# Patient Record
Sex: FEMALE | Race: WHITE | NOT HISPANIC OR LATINO | Employment: FULL TIME | URBAN - METROPOLITAN AREA
[De-identification: names, ages, dates, MRNs, and addresses within clinical notes are randomized per-mention and may not be internally consistent; named-entity substitution may affect disease eponyms.]

---

## 2017-02-03 ENCOUNTER — APPOINTMENT (OUTPATIENT)
Dept: MEDICAL GROUP | Age: 47
End: 2017-02-03
Payer: COMMERCIAL

## 2017-04-10 ENCOUNTER — OFFICE VISIT (OUTPATIENT)
Dept: MEDICAL GROUP | Age: 47
End: 2017-04-10
Payer: COMMERCIAL

## 2017-04-10 VITALS
HEIGHT: 62 IN | HEART RATE: 85 BPM | SYSTOLIC BLOOD PRESSURE: 102 MMHG | OXYGEN SATURATION: 98 % | BODY MASS INDEX: 29.22 KG/M2 | TEMPERATURE: 98.1 F | DIASTOLIC BLOOD PRESSURE: 62 MMHG | WEIGHT: 158.8 LBS

## 2017-04-10 DIAGNOSIS — B00.1 HERPES LABIALIS: ICD-10-CM

## 2017-04-10 DIAGNOSIS — Z23 NEED FOR VACCINATION: ICD-10-CM

## 2017-04-10 DIAGNOSIS — Z12.31 SCREENING MAMMOGRAM, ENCOUNTER FOR: ICD-10-CM

## 2017-04-10 DIAGNOSIS — M53.3 SI (SACROILIAC) PAIN: ICD-10-CM

## 2017-04-10 PROCEDURE — 90471 IMMUNIZATION ADMIN: CPT | Performed by: FAMILY MEDICINE

## 2017-04-10 PROCEDURE — 90715 TDAP VACCINE 7 YRS/> IM: CPT | Performed by: FAMILY MEDICINE

## 2017-04-10 PROCEDURE — 99214 OFFICE O/P EST MOD 30 MIN: CPT | Mod: 25 | Performed by: FAMILY MEDICINE

## 2017-04-10 RX ORDER — ACYCLOVIR 400 MG/1
400 TABLET ORAL 3 TIMES DAILY
Qty: 30 TAB | Refills: 2 | Status: SHIPPED | OUTPATIENT
Start: 2017-04-10 | End: 2018-03-05

## 2017-04-10 NOTE — Clinical Note
[facility] ??? [facility]  Nelda Jaimes M.D.  [address]  [place] [address]  Fax: [phone]   Authorization for Release/Disclosure of   Protected Health Information   Name: SAMANTHA ROBLERO : 1970 SSN: [id number]   Address: [address] [place] [address]  [place] [address] Phone:    [phone] (home)    I authorize the entity listed below to release/disclose the PHI below to:   [facility]/Nelda Jaimes M.D. and Nelda Jaimes M.D.   Provider or Entity Name:  [facility]   Address   City, State, Zip   Phone:      Fax:     Reason for request: continuity of care   Information to be released:    [  ] LAST COLONOSCOPY,  including any PATH REPORT and follow-up  [  ] LAST FIT/COLOGUARD RESULT [  ] LAST DEXA  [  ] LAST MAMMOGRAM  [  ] LAST PAP  [  ] LAST LABS [  ] RETINA EXAM REPORT  [  ] IMMUNIZATION RECORDS  [ x ] Release all info      [  ] Check here and initial the line next to each item to release ALL health information INCLUDING  _____ Care and treatment for drug and / or alcohol abuse  _____ HIV testing, infection status, or AIDS  _____ Genetic Testing    DATES OF SERVICE OR TIME PERIOD TO BE DISCLOSED: _____________  I understand and acknowledge that:  * This Authorization may be revoked at any time by you in writing, except if your health information has already been used or disclosed.  * Your health information that will be used or disclosed as a result of you signing this authorization could be re-disclosed by the recipient. If this occurs, your re-disclosed health information may no longer be protected by State or Federal laws.  * You may refuse to sign this Authorization. Your refusal will not affect your ability to obtain treatment.  * This Authorization becomes effective upon signing and will  on (date) __________.      If no date is indicated, this Authorization will  one (1) year from the signature date.    Name: Samantha Roblero    Signature:   Date:     4/10/2017       PLEASE FAX REQUESTED  RECORDS BACK TO: [phone]

## 2017-04-10 NOTE — PROGRESS NOTES
Subjective:     Chief Complaint   Patient presents with   • Back Pain     Lower back. Started at her tailbone. Happened by doing a weighted forward lung. x 3 weeks. Was seen at [facility].    • Weight Gain     Samantha Baker is a 46 y.o. female here today for issues listed below      3 weeks ago had sudden low back pain while doing a forward lunge. Dull ache with sharp pain at side of low back with radiation to buttock. Was seen in [facility]. X-ray normal. MRI ordered by not approved by insurance. No red flags of cauda equina. Since then some help with nsaids and limited activity.  Has not tried ice.  HSV oral - outbreaks a few times per year. Needs med refill. No change to frequency, severity or duration of outbreaks. No genital lesions.   Has gained some wt since last OV. (1 lb) worries will gain more since not able to exercise like usual.     Allergies: Review of patient's allergies indicates no known allergies.  Current medicines (including changes today)  Current Outpatient Prescriptions   Medication Sig Dispense Refill   • acyclovir (ZOVIRAX) 400 MG tablet Take 1 Tab by mouth 3 times a day. For 5-7 days as needed for outbreak 30 Tab 2   • fluticasone (FLONASE) 50 MCG/ACT nasal spray Spray 2 Sprays in nose every day. (Patient not taking: Reported on 4/10/2017) 16 g 3     No current facility-administered medications for this visit.       She  has a past medical history of Hyperlipidemia (6/8/2011); Metabolic syndrome (9/4/2011); History of tobacco use (9/4/2011); and Acne rosacea (9/4/2011).  Health Maintenance: due for mammo and Tdap  ROS  Constitutional: Negative for fever, chills/sweats   Respiratory: Negative for shortness of breath, LYNN  Cardiovascular: Negative for chest pain or pressure  GI/: Negative for diarrhea/constipation / urinary difficulty  All other systems reviewed and are negative except as per HPI.        Objective:     Blood pressure 102/62, pulse 85, temperature 36.7 °C (98.1 °F), height 1.562 m  "(5' 1.5\"), weight 72.031 kg (158 lb 12.8 oz), last menstrual period 03/20/2017, SpO2 98 %, not currently breastfeeding. Body mass index is 29.52 kg/(m^2).  Physical Exam:  Alert, oriented in no acute distress.  Eye contact is good, speech goal directed, affect calm   Oral mucous membranes pink and moist with no lesions.  Neck No adenopathy or masses in the neck or supraclavicular regions.   SLT negative. Strength 5/5 prox and distal LE. Moderate tenderness in paraspinous muscles lumbar spine without current spasm. No spinous process tenderness.  moderate pain with stress of SI. Full hip ROM Fair hamstring flexibility.       Assessment and Plan:     Reassured about weight. Discussed kcal monitoring and doing upper body and abd exercises.     Treatment Changes: NSAIDs advised    Samantha was seen today for back pain and weight gain.    Diagnoses and all orders for this visit:    SI (sacroiliac) pain  Comments:  advised relative rest, ice, NSAID and PT  Orders:  -     REFERRAL TO PHYSICAL THERAPY Reason for Therapy: Eval/Treat/Report    Need for vaccination  Comments:  updated today  Orders:  -     TDAP VACCINE =>6YO IM    Screening mammogram, encounter for  Comments:  order given  Orders:  -     MA-SCREEN MAMMO W/CAD-BILAT; Future    Herpes labialis  Comments:  continue med prn  Orders:  -     acyclovir (ZOVIRAX) 400 MG tablet; Take 1 Tab by mouth 3 times a day. For 5-7 days as needed for outbreak        Followup: Return in about 5 months (around 9/10/2017) for  Pap - Dr Del Valle at [facility]. sooner should new symptoms or problems arise.           "

## 2017-04-10 NOTE — MR AVS SNAPSHOT
"        Samantha Baker   4/10/2017 8:10 AM   Office Visit   MRN: [id number]    Department:  [facility]   Dept Phone:  [phone]    Description:  Female : 1970   Provider:  Nelda Jaimes M.D.           Reason for Visit     Back Pain Lower back. Started at her tailbone. Happened by doing a weighted forward lung. x 3 weeks. Was seen at [facility].     Weight Gain           Allergies as of 4/10/2017     No Known Allergies      You were diagnosed with     SI (sacroiliac) pain   [598921]       Need for vaccination   [[id number]]       Screening mammogram, encounter for   [[id number]]       Herpes labialis   [329032]         Vital Signs     Blood Pressure Pulse Temperature Height Weight Body Mass Index    102/62 mmHg 85 36.7 °C (98.1 °F) 1.562 m (5' 1.5\") 72.031 kg (158 lb 12.8 oz) 29.52 kg/m2    Oxygen Saturation Last Menstrual Period Breastfeeding? Smoking Status          98% 2017 No Former Smoker        Basic Information     Date Of Birth Sex Race Ethnicity Preferred Language    1970 Female White Non- English      Your appointments     Sep 12, 2017  7:00 AM   Established Patient with Sabrina Del Valle M.D.   [facility] (--)    [address] [place] [id number] [phone]           You will be receiving a confirmation call a few days before your appointment from our automated call confirmation system.              Problem List              ICD-10-CM Priority Class Noted - Resolved    Hyperlipidemia E78.5   2011 - Present    History of tobacco use Z87.891   2011 - Present    Herpes labialis B00.1   3/13/2014 - Present    Herpes simplex infection of genitourinary system A60.00   10/17/2016 - Present    Overweight (BMI 25.0-29.9) E66.3   10/17/2016 - Present    Seasonal allergic rhinitis due to pollen J30.1   10/17/2016 - Present      Health Maintenance        Date Due Completion Dates    IMM DTaP/Tdap/Td Vaccine (1 - Tdap) 1991 " ---    MAMMOGRAM 11/14/2017 (Originally 7/19/2012) ---    PAP SMEAR 8/26/2017 8/26/2014, 6/8/2011            Current Immunizations     Tdap Vaccine  Incomplete      Below and/or attached are the medications your provider expects you to take. Review all of your home medications and newly ordered medications with your provider and/or pharmacist. Follow medication instructions as directed by your provider and/or pharmacist. Please keep your medication list with you and share with your provider. Update the information when medications are discontinued, doses are changed, or new medications (including over-the-counter products) are added; and carry medication information at all times in the event of emergency situations     Allergies:  No Known Allergies          Medications  Valid as of: April 10, 2017 -  8:29 AM    Generic Name Brand Name Tablet Size Instructions for use    Acyclovir (Tab) ZOVIRAX 400 MG Take 1 Tab by mouth 3 times a day. For 5-7 days as needed for outbreak        Fluticasone Propionate (Suspension) FLONASE 50 MCG/ACT Spray 2 Sprays in nose every day.        .                 Medicines prescribed today were sent to:     [facility] [address] [place] [address], [place] [address]    [address] [place] [address]    Phone: [phone] Fax: [phone]    Open 24 Hours?: No      Medication refill instructions:       If your prescription bottle indicates you have medication refills left, it is not necessary to call your provider’s office. Please contact your pharmacy and they will refill your medication.    If your prescription bottle indicates you do not have any refills left, you may request refills at any time through one of the following ways: The online [employer] system (except Urgent Care), by calling your provider’s office, or by asking your pharmacy to contact your provider’s office with a refill request. Medication refills are processed only during regular business hours and may not be  available until the next business day. Your provider may request additional information or to have a follow-up visit with you prior to refilling your medication.   *Please Note: Medication refills are assigned a new Rx number when refilled electronically. Your pharmacy may indicate that no refills were authorized even though a new prescription for the same medication is available at the pharmacy. Please request the medicine by name with the pharmacy before contacting your provider for a refill.        Your To Do List     Future Labs/Procedures Complete By Expires    MA-SCREEN MAMMO W/CAD-BILAT  As directed 5/11/2018      Referral     A referral request has been sent to our patient care coordination department. Please allow 3-5 business days for us to process this request and contact you either by phone or mail. If you do not hear from us by the 5th business day, please call us at [phone].        Instructions    Take naproxen twice per day with food for 5 days then just in am as needed.   Stretch!            [employer] Access Code: Activation code not generated  Current [employer] Status: Active

## 2017-08-05 ENCOUNTER — OFFICE VISIT (OUTPATIENT)
Dept: URGENT CARE | Facility: CLINIC | Age: 47
End: 2017-08-05
Payer: COMMERCIAL

## 2017-08-05 VITALS
DIASTOLIC BLOOD PRESSURE: 80 MMHG | TEMPERATURE: 98.9 F | HEIGHT: 61 IN | WEIGHT: 161.2 LBS | BODY MASS INDEX: 30.43 KG/M2 | OXYGEN SATURATION: 95 % | RESPIRATION RATE: 16 BRPM | HEART RATE: 99 BPM | SYSTOLIC BLOOD PRESSURE: 124 MMHG

## 2017-08-05 DIAGNOSIS — R30.0 DYSURIA: ICD-10-CM

## 2017-08-05 DIAGNOSIS — N30.01 ACUTE CYSTITIS WITH HEMATURIA: ICD-10-CM

## 2017-08-05 LAB
APPEARANCE UR: CLEAR
BILIRUB UR STRIP-MCNC: NORMAL MG/DL
COLOR UR AUTO: YELLOW
GLUCOSE UR STRIP.AUTO-MCNC: NORMAL MG/DL
KETONES UR STRIP.AUTO-MCNC: NORMAL MG/DL
LEUKOCYTE ESTERASE UR QL STRIP.AUTO: NORMAL
NITRITE UR QL STRIP.AUTO: NORMAL
PH UR STRIP.AUTO: 5 [PH] (ref 5–8)
PROT UR QL STRIP: NORMAL MG/DL
RBC UR QL AUTO: NORMAL
SP GR UR STRIP.AUTO: 1.02
UROBILINOGEN UR STRIP-MCNC: NORMAL MG/DL

## 2017-08-05 PROCEDURE — 81002 URINALYSIS NONAUTO W/O SCOPE: CPT | Performed by: NURSE PRACTITIONER

## 2017-08-05 PROCEDURE — 99214 OFFICE O/P EST MOD 30 MIN: CPT | Performed by: NURSE PRACTITIONER

## 2017-08-05 RX ORDER — PHENAZOPYRIDINE HYDROCHLORIDE 200 MG/1
200 TABLET, FILM COATED ORAL 3 TIMES DAILY
Qty: 6 TAB | Refills: 0 | Status: SHIPPED | OUTPATIENT
Start: 2017-08-05 | End: 2017-08-07

## 2017-08-05 RX ORDER — NITROFURANTOIN 25; 75 MG/1; MG/1
100 CAPSULE ORAL EVERY 12 HOURS
Qty: 10 CAP | Refills: 0 | Status: SHIPPED | OUTPATIENT
Start: 2017-08-05 | End: 2017-09-26 | Stop reason: SDUPTHER

## 2017-08-05 ASSESSMENT — ENCOUNTER SYMPTOMS
VOMITING: 0
NAUSEA: 0
FLANK PAIN: 0
CHILLS: 0

## 2017-08-05 ASSESSMENT — PAIN SCALES - GENERAL: PAINLEVEL: NO PAIN

## 2017-08-05 NOTE — PROGRESS NOTES
Subjective:      Samantha Baker is a 47 y.o. female who presents with Dysuria            Dysuria   This is a new problem. Episode onset: started about 4 days ago with pain and frequency. She has had UTIs in the past and this is exactly the symptoms she usually has. The problem occurs every urination. The problem has been unchanged. The quality of the pain is described as burning. There has been no fever. She is sexually active. There is no history of pyelonephritis. Associated symptoms include frequency, hematuria and urgency. Pertinent negatives include no chills, discharge, flank pain, nausea or vomiting. Associated symptoms comments: She is currently on her period. She has tried increased fluids and antibiotics (States she is taking some of her kids left over Amox for the last two days. She does not know the dose, but she has taken 4 pills so far) for the symptoms. The treatment provided no relief. There is no history of kidney stones or recurrent UTIs.       Review of Systems   Constitutional: Negative for chills.   Gastrointestinal: Negative for nausea and vomiting.   Genitourinary: Positive for dysuria, urgency, frequency and hematuria. Negative for flank pain.   All other systems reviewed and are negative.    Past Medical History   Diagnosis Date   • Hyperlipidemia 6/8/2011   • Metabolic syndrome 9/4/2011   • History of tobacco use 9/4/2011   • Acne rosacea 9/4/2011      Past Surgical History   Procedure Laterality Date   • Elana by laparoscopy  11/27/08     Performed by LUCAS HILL at SURGERY [facility]   • Cervical disk and fusion anterior  3/8/2016     Procedure: CERVICAL DISK AND FUSION ANTERIOR C6-7;  Surgeon: Jonas Ramos M.D.;  Location: SURGERY [facility];  Service:       Social History     Social History   • Marital Status:      Spouse Name: N/A   • Number of Children: N/A   • Years of Education: N/A     Occupational History   • Not on file.     Social History Main  "Topics   • Smoking status: Former Smoker -- 1.00 packs/day for 10 years     Types: Cigarettes     Quit date: 01/01/2008   • Smokeless tobacco: Never Used   • Alcohol Use: No   • Drug Use: No   • Sexual Activity:     Partners: Male      Comment: vasectomy     Other Topics Concern   • Not on file     Social History Narrative          Objective:     /80 mmHg  Pulse 99  Temp(Src) 37.2 °C (98.9 °F)  Resp 16  Ht 1.554 m (5' 1.2\")  Wt 73.12 kg (161 lb 3.2 oz)  BMI 30.28 kg/m2  SpO2 95%  Breastfeeding? No     Physical Exam   Constitutional: She is oriented to person, place, and time. Vital signs are normal. She appears well-developed and well-nourished.   HENT:   Head: Normocephalic and atraumatic.   Eyes: EOM are normal. Pupils are equal, round, and reactive to light.   Neck: Normal range of motion.   Cardiovascular: Normal rate and regular rhythm.    Pulmonary/Chest: Effort normal.   Abdominal: Soft. Normal appearance. There is tenderness in the suprapubic area. There is no CVA tenderness.   Musculoskeletal: Normal range of motion.   Neurological: She is alert and oriented to person, place, and time.   Skin: Skin is warm and dry.   Psychiatric: She has a normal mood and affect. Her speech is normal and behavior is normal. Thought content normal.   Vitals reviewed.         Lab Results   Component Value Date/Time    POC COLOR yellow 08/05/2017 01:45 PM    POC APPEARANCE clear 08/05/2017 01:45 PM    POC LEUKOCYTE ESTERASE neg 08/05/2017 01:45 PM    POC NITRITES neg 08/05/2017 01:45 PM    POC UROBILIGEN neg 08/05/2017 01:45 PM    POC PROTEIN neg 08/05/2017 01:45 PM    POC URINE PH 5.0 08/05/2017 01:45 PM    POC BLOOD trace hemo 08/05/2017 01:45 PM    POC SPECIFIC GRAVITY 1.025 08/05/2017 01:45 PM    POC KETONES neg 08/05/2017 01:45 PM    POC BILIRUBEN neg 08/05/2017 01:45 PM    POC GLUCOSE neg 08/05/2017 01:45 PM           Assessment/Plan:     1. Acute cystitis with hematuria [N30.01]  - nitrofurantoin monohydr " macro (MACROBID) 100 MG Cap; Take 1 Cap by mouth every 12 hours for 5 days.  Dispense: 10 Cap; Refill: 0  - phenazopyridine (PYRIDIUM) 200 MG Tab; Take 1 Tab by mouth 3 times a day for 2 days.  Dispense: 6 Tab; Refill: 0    2. Dysuria  - POCT Urinalysis    Increase water intake  Discontinue use of other antibiotic  RTC if s/s do not improve  Supportive care, differential diagnoses, and indications for immediate follow-up discussed with patient.    Pathogenesis of diagnosis discussed including typical length and natural progression.      Instructed to return to  or nearest emergency department if symptoms fail to improve, for any change in condition, further concerns, or new concerning symptoms.  Patient states understanding of the plan of care and discharge instructions.

## 2017-08-05 NOTE — MR AVS SNAPSHOT
"        Samantha Baker   2017 1:30 PM   Office Visit   MRN: [id number]    Department:  [facility]   Dept Phone:  [phone]    Description:  Female : 1970   Provider:  CATHERINE Del Toro           Reason for Visit     Dysuria poss UTI      Allergies as of 2017     No Known Allergies      You were diagnosed with     Acute cystitis with hematuria   [469430]       Dysuria   [788.1.ICD-9-CM]         Vital Signs     Blood Pressure Pulse Temperature Respirations Height Weight    124/80 mmHg 99 37.2 °C (98.9 °F) 16 1.554 m (5' 1.2\") 73.12 kg (161 lb 3.2 oz)    Body Mass Index Oxygen Saturation Breastfeeding? Smoking Status          30.28 kg/m2 95% No Former Smoker        Basic Information     Date Of Birth Sex Race Ethnicity Preferred Language    1970 Female White Non- English      Your appointments     Sep 12, 2017  7:00 AM   Established Patient with Sabrina Del Valle M.D.   [facility] (--)    [address] [place] [id number] [phone]           You will be receiving a confirmation call a few days before your appointment from our automated call confirmation system.              Problem List              ICD-10-CM Priority Class Noted - Resolved    Hyperlipidemia E78.5   2011 - Present    History of tobacco use Z87.891   2011 - Present    Herpes labialis B00.1   3/13/2014 - Present    Herpes simplex infection of genitourinary system A60.00   10/17/2016 - Present    Overweight (BMI 25.0-29.9) E66.3   10/17/2016 - Present    Seasonal allergic rhinitis due to pollen J30.1   10/17/2016 - Present      Health Maintenance        Date Due Completion Dates    MAMMOGRAM 2017 (Originally 2012) ---    PAP SMEAR 2017, 2011    IMM INFLUENZA (1) 2017 ---    IMM DTaP/Tdap/Td Vaccine (2 - Td) 4/10/2027 4/10/2017            Results     POCT Urinalysis      Component Value Standard Range & Units   "    POC Color yellow Negative    POC Appearance clear Negative    POC Leukocyte Esterase neg Negative    POC Nitrites neg Negative    POC Urobiligen neg Negative (0.2) mg/dL    POC Protein neg Negative mg/dL    POC Urine PH 5.0 5.0 - 8.0    POC Blood trace hemo Negative    POC Specific Gravity 1.025 <1.005 - >1.030    POC Ketones neg Negative mg/dL    POC Biliruben neg Negative mg/dL    POC Glucose neg Negative mg/dL                        Current Immunizations     Tdap Vaccine 4/10/2017      Below and/or attached are the medications your provider expects you to take. Review all of your home medications and newly ordered medications with your provider and/or pharmacist. Follow medication instructions as directed by your provider and/or pharmacist. Please keep your medication list with you and share with your provider. Update the information when medications are discontinued, doses are changed, or new medications (including over-the-counter products) are added; and carry medication information at all times in the event of emergency situations     Allergies:  No Known Allergies          Medications  Valid as of: August 05, 2017 -  3:42 PM    Generic Name Brand Name Tablet Size Instructions for use    Acyclovir (Tab) ZOVIRAX 400 MG Take 1 Tab by mouth 3 times a day. For 5-7 days as needed for outbreak        Fluticasone Propionate (Suspension) FLONASE 50 MCG/ACT Spray 2 Sprays in nose every day.        Nitrofurantoin Monohyd Macro (Cap) MACROBID 100 MG Take 1 Cap by mouth every 12 hours for 5 days.        Phenazopyridine HCl (Tab) PYRIDIUM 200 MG Take 1 Tab by mouth 3 times a day for 2 days.        .                 Medicines prescribed today were sent to:     [facility] [place] - [address]    [address] [place] [address]    Phone: [phone] Fax: [phone]    Open 24 Hours?: No      Medication refill instructions:       If your prescription bottle indicates you have medication  refills left, it is not necessary to call your provider’s office. Please contact your pharmacy and they will refill your medication.    If your prescription bottle indicates you do not have any refills left, you may request refills at any time through one of the following ways: The online [employer] system (except Urgent Care), by calling your provider’s office, or by asking your pharmacy to contact your provider’s office with a refill request. Medication refills are processed only during regular business hours and may not be available until the next business day. Your provider may request additional information or to have a follow-up visit with you prior to refilling your medication.   *Please Note: Medication refills are assigned a new Rx number when refilled electronically. Your pharmacy may indicate that no refills were authorized even though a new prescription for the same medication is available at the pharmacy. Please request the medicine by name with the pharmacy before contacting your provider for a refill.           [employer] Access Code: Activation code not generated  Current [employer] Status: Active

## 2017-09-26 ENCOUNTER — HOSPITAL ENCOUNTER (OUTPATIENT)
Facility: MEDICAL CENTER | Age: 47
End: 2017-09-26
Attending: NURSE PRACTITIONER
Payer: COMMERCIAL

## 2017-09-26 ENCOUNTER — OFFICE VISIT (OUTPATIENT)
Dept: MEDICAL GROUP | Facility: CLINIC | Age: 47
End: 2017-09-26
Payer: COMMERCIAL

## 2017-09-26 VITALS
BODY MASS INDEX: 29.44 KG/M2 | WEIGHT: 160 LBS | HEART RATE: 82 BPM | RESPIRATION RATE: 14 BRPM | OXYGEN SATURATION: 95 % | SYSTOLIC BLOOD PRESSURE: 120 MMHG | HEIGHT: 62 IN | TEMPERATURE: 98.9 F | DIASTOLIC BLOOD PRESSURE: 78 MMHG

## 2017-09-26 DIAGNOSIS — R30.0 DYSURIA: ICD-10-CM

## 2017-09-26 DIAGNOSIS — Z28.21 INFLUENZA VACCINATION DECLINED: ICD-10-CM

## 2017-09-26 LAB
APPEARANCE UR: NORMAL
BILIRUB UR STRIP-MCNC: NORMAL MG/DL
COLOR UR AUTO: NORMAL
GLUCOSE UR STRIP.AUTO-MCNC: NORMAL MG/DL
KETONES UR STRIP.AUTO-MCNC: NORMAL MG/DL
LEUKOCYTE ESTERASE UR QL STRIP.AUTO: NORMAL
NITRITE UR QL STRIP.AUTO: NORMAL
PH UR STRIP.AUTO: NORMAL [PH] (ref 5–8)
PROT UR QL STRIP: NORMAL MG/DL
RBC UR QL AUTO: NORMAL
SP GR UR STRIP.AUTO: NORMAL
UROBILINOGEN UR STRIP-MCNC: NORMAL MG/DL

## 2017-09-26 PROCEDURE — 87186 SC STD MICRODIL/AGAR DIL: CPT

## 2017-09-26 PROCEDURE — 81002 URINALYSIS NONAUTO W/O SCOPE: CPT | Performed by: NURSE PRACTITIONER

## 2017-09-26 PROCEDURE — 99214 OFFICE O/P EST MOD 30 MIN: CPT | Performed by: NURSE PRACTITIONER

## 2017-09-26 PROCEDURE — 87086 URINE CULTURE/COLONY COUNT: CPT

## 2017-09-26 PROCEDURE — 87077 CULTURE AEROBIC IDENTIFY: CPT

## 2017-09-26 RX ORDER — NITROFURANTOIN 25; 75 MG/1; MG/1
100 CAPSULE ORAL EVERY 12 HOURS
Qty: 10 CAP | Refills: 0 | Status: SHIPPED | OUTPATIENT
Start: 2017-09-26 | End: 2017-09-27 | Stop reason: SINTOL

## 2017-09-26 RX ORDER — FLUCONAZOLE 150 MG/1
150 TABLET ORAL ONCE
Qty: 1 TAB | Refills: 0 | Status: SHIPPED | OUTPATIENT
Start: 2017-09-26 | End: 2017-09-26

## 2017-09-26 ASSESSMENT — PATIENT HEALTH QUESTIONNAIRE - PHQ9: CLINICAL INTERPRETATION OF PHQ2 SCORE: 0

## 2017-09-26 NOTE — PROGRESS NOTES
"CC: Dysuria (frequency and urgency x 1 days)        HPI:     Samantha is a [facility] patient, all problems new to me, presents today for the followin. Dysuria  Here c/o 1 days with urgency and dysuria.  No fever.  Historically has similar sx frequently-also postcoital.  macrobid has helped in the past.  Last CNS was negative.          Current Outpatient Prescriptions   Medication Sig Dispense Refill   • nitrofurantoin monohydr macro (MACROBID) 100 MG Cap Take 1 Cap by mouth every 12 hours for 5 days. 10 Cap 0   • fluconazole (DIFLUCAN) 150 MG tablet Take 1 Tab by mouth Once for 1 dose. 1 Tab 0   • acyclovir (ZOVIRAX) 400 MG tablet Take 1 Tab by mouth 3 times a day. For 5-7 days as needed for outbreak 30 Tab 2   • fluticasone (FLONASE) 50 MCG/ACT nasal spray Spray 2 Sprays in nose every day. (Patient not taking: Reported on 4/10/2017) 16 g 3     No current facility-administered medications for this visit.      Social History   Substance Use Topics   • Smoking status: Former Smoker     Packs/day: 1.00     Years: 10.00     Types: Cigarettes     Quit date: 2008   • Smokeless tobacco: Never Used   • Alcohol use No     I reviewed patients allergies, problem list and medications today in EPIC.    ROS: Any/all pertinent positives listed in the HPI, otherwise all others reviewed are negative today.      /78   Pulse 82   Temp 37.2 °C (98.9 °F)   Resp 14   Ht 1.562 m (5' 1.5\")   Wt 72.6 kg (160 lb)   SpO2 95%   BMI 29.74 kg/m²     Exam:    Gen: Alert and oriented, No apparent distress. WDWN  Psych: A+Ox3, normal affect and mood  Skin: Warm, dry and intact. Good turgor   No rashes in visible areas.  Eye: Conjunctiva clear, lids normal  ENMT: Lips without lesions, good dentition  Lungs: Clear to auscultation bilaterally, no rales or rhonchi   Unlabored respiratory effort.   CV: Regular rate and rhythm, S1, S2. No murmurs.   No Edema  Abd: mild suprapubic tenderness  No CVAT  Office ua: (patient took azo)  "       Assessment and Plan.   47 y.o. female with the following issues.    1. Dysuria  Stable. Push fluids, void after IC.  Has previously tolerated macrobid well-will redo this.  Diflucan sent per patient request due to propensity for VVC after ABX.  Culture sent.  We did discuss prophylactic abx after IC as an option.  - POCT Urinalysis  - nitrofurantoin monohydr macro (MACROBID) 100 MG Cap; Take 1 Cap by mouth every 12 hours for 5 days.  Dispense: 10 Cap; Refill: 0  - URINE CULTURE(NEW); Future  - fluconazole (DIFLUCAN) 150 MG tablet; Take 1 Tab by mouth Once for 1 dose.  Dispense: 1 Tab; Refill: 0    2. Influenza vaccination declined

## 2017-09-27 ENCOUNTER — TELEPHONE (OUTPATIENT)
Dept: MEDICAL GROUP | Facility: CLINIC | Age: 47
End: 2017-09-27

## 2017-09-27 RX ORDER — SULFAMETHOXAZOLE AND TRIMETHOPRIM 800; 160 MG/1; MG/1
1 TABLET ORAL 2 TIMES DAILY
Qty: 10 TAB | Refills: 0 | Status: SHIPPED | OUTPATIENT
Start: 2017-09-27 | End: 2017-10-02

## 2017-09-27 NOTE — TELEPHONE ENCOUNTER
That is interesting since she took the same antibiotic last month without any problems.  BUT it could be a developing allergy so absolutely-we should have her stop it and I will prescribe a new ABX to start today.    Recommend benadryl for itch.    [facility]

## 2017-09-27 NOTE — TELEPHONE ENCOUNTER
[phone] (home)     Started Macrobid 9/26/17, since then has been itching all over. ? Change Abx ? Please advise

## 2017-09-28 LAB
BACTERIA UR CULT: ABNORMAL
SIGNIFICANT IND 70042: ABNORMAL
SOURCE SOURCE: ABNORMAL

## 2018-01-21 ENCOUNTER — OFFICE VISIT (OUTPATIENT)
Dept: URGENT CARE | Facility: CLINIC | Age: 48
End: 2018-01-21
Payer: COMMERCIAL

## 2018-01-21 VITALS
SYSTOLIC BLOOD PRESSURE: 100 MMHG | HEIGHT: 62 IN | HEART RATE: 79 BPM | OXYGEN SATURATION: 95 % | TEMPERATURE: 97.9 F | WEIGHT: 162 LBS | DIASTOLIC BLOOD PRESSURE: 82 MMHG | BODY MASS INDEX: 29.81 KG/M2 | RESPIRATION RATE: 14 BRPM

## 2018-01-21 DIAGNOSIS — H69.91 DYSFUNCTION OF RIGHT EUSTACHIAN TUBE: ICD-10-CM

## 2018-01-21 PROCEDURE — 99214 OFFICE O/P EST MOD 30 MIN: CPT | Performed by: FAMILY MEDICINE

## 2018-01-21 RX ORDER — AMOXICILLIN AND CLAVULANATE POTASSIUM 875; 125 MG/1; MG/1
1 TABLET, FILM COATED ORAL 2 TIMES DAILY
Qty: 20 TAB | Refills: 0 | Status: SHIPPED | OUTPATIENT
Start: 2018-01-21 | End: 2018-01-31

## 2018-01-21 NOTE — PROGRESS NOTES
"HPI: Samantha Baker is a 47 y.o. female who presents with right ear pain.     Patient is urgent care with acute onset of right ear pain started today. She states that off and on for the past several weeks she's had sinus congestion mild sore throats entire office has been ill. No nausea vomiting or diarrhea.  Worsened by: activity, laying supine at night, first thing in the morning, when exposed to outside allergens  Improved by: OTC symptomatic medictions    PMH:  has a past medical history of Acne rosacea (9/4/2011); History of tobacco use (9/4/2011); Hyperlipidemia (6/8/2011); and Metabolic syndrome (9/4/2011).  MEDS:   Current Outpatient Prescriptions:   •  acyclovir (ZOVIRAX) 400 MG tablet, Take 1 Tab by mouth 3 times a day. For 5-7 days as needed for outbreak, Disp: 30 Tab, Rfl: 2  •  fluticasone (FLONASE) 50 MCG/ACT nasal spray, Spray 2 Sprays in nose every day. (Patient not taking: Reported on 4/10/2017), Disp: 16 g, Rfl: 3  ALLERGIES:   Allergies   Allergen Reactions   • Macrobid [Nitrofurantoin] Itching   SURGHX:   Past Surgical History:   Procedure Laterality Date   • CERVICAL DISK AND FUSION ANTERIOR  3/8/2016    Procedure: CERVICAL DISK AND FUSION ANTERIOR C6-7;  Surgeon: Jonas Ramos M.D.;  Location: SURGERY [facility];  Service:    • GM BY LAPAROSCOPY  11/27/08    Performed by LUCAS HILL at SURGERY [facility]   SOCHX:  reports that she quit smoking about 10 years ago. Her smoking use included Cigarettes. She has a 10.00 pack-year smoking history. She has never used smokeless tobacco. She reports that she does not drink alcohol or use drugs.  FH: Family history was reviewed, no pertinent findings to report    PE:  Vitals Blood pressure 100/82, pulse 79, temperature 36.6 °C (97.9 °F), resp. rate 14, height 1.562 m (5' 1.5\"), weight 73.5 kg (162 lb), SpO2 95 %.  Gen AOx4, NAD  HEENT: moist mucus membranes, no pain or pressure with percussion of frontal, maxillary or ethmoid " sinuses.  Bilateral conjunciva clear without erythema or exudate,  Right TM with erythema and dullness bulge and loss of landmarks, left TM clear without bulge, fluid or loss of landmarks, no pharyngeal erythema or tonsillar exudate or tonsillar enlargement  Neck: supple, no cervical lymphadenopathy, no signs of menigismus  CV/PULM: RRR no murmurs, no rales ronchi or wheezes, no signs of resp distress  Abd soft nontender, bs present  Skin no rashes  Extremities -c/c/e  Neuro appropriate affect,     A/P  1. Dysfunction of right eustachian tube  amoxicillin-clavulanate (AUGMENTIN) 875-125 MG Tab   Follow-up with primary care provider within 4-5 days, emergency room precautions discussed.  Patient and/or family appears understanding of information.

## 2018-01-29 ENCOUNTER — TELEPHONE (OUTPATIENT)
Dept: URGENT CARE | Facility: CLINIC | Age: 48
End: 2018-01-29

## 2018-03-05 ENCOUNTER — OFFICE VISIT (OUTPATIENT)
Dept: MEDICAL GROUP | Facility: MEDICAL CENTER | Age: 48
End: 2018-03-05
Payer: COMMERCIAL

## 2018-03-05 VITALS
HEIGHT: 62 IN | SYSTOLIC BLOOD PRESSURE: 116 MMHG | OXYGEN SATURATION: 96 % | HEART RATE: 74 BPM | BODY MASS INDEX: 29.44 KG/M2 | RESPIRATION RATE: 14 BRPM | TEMPERATURE: 99.6 F | DIASTOLIC BLOOD PRESSURE: 74 MMHG | WEIGHT: 160 LBS

## 2018-03-05 DIAGNOSIS — R29.91 SYMPTOMS REFERABLE TO HIP JOINT: ICD-10-CM

## 2018-03-05 DIAGNOSIS — H92.01 RIGHT EAR PAIN: ICD-10-CM

## 2018-03-05 DIAGNOSIS — Z00.00 PREVENTATIVE HEALTH CARE: ICD-10-CM

## 2018-03-05 DIAGNOSIS — Z76.89 ENCOUNTER TO ESTABLISH CARE: ICD-10-CM

## 2018-03-05 PROBLEM — H92.02 LEFT EAR PAIN: Status: ACTIVE | Noted: 2018-03-05

## 2018-03-05 PROBLEM — R19.8 ABDOMINAL SYMPTOMS: Status: ACTIVE | Noted: 2018-03-05

## 2018-03-05 PROCEDURE — 99214 OFFICE O/P EST MOD 30 MIN: CPT | Performed by: PHYSICIAN ASSISTANT

## 2018-03-05 NOTE — ASSESSMENT & PLAN NOTE
She also states since Friday approximately 3 days she's had a pulsating sound when she is sitting. States initially she thought that symptoms were secondary to a heater there is no heater around. Feels like there is a buzzing in her hips. She denies any recent trauma. Does complain of a chronic history of low back issues. Denies any issues with abdominal pain or pelvic pain. Denies any issues with walking. No constipation or diarrhea. States when she lies down. There is no symptoms.

## 2018-03-05 NOTE — ASSESSMENT & PLAN NOTE
This is a 47-year-old female who is here today to establish care. She complains of having left ear pain approximately 2 months ago secondary to a infection. She was seen at urgent care. She was diagnosed with eustachian tube dysfunction. She was given amoxicillin which did not change her symptoms. Her pain did improve but then over the past week return. She complains of pain that radiates down the jaw. It is intermittent. It appears that the symptoms may have reappeared. She denies any fevers. No significant nasal congestion. Uses Flonase for allergies as well as for her ear initially.

## 2018-03-05 NOTE — PROGRESS NOTES
Subjective:   Samanhta Baker is a 47 y.o. female here today for right ear pain worse over the past week.    Right ear pain  This is a 47-year-old female who is here today to establish care. She complains of having left ear pain approximately 2 months ago secondary to a infection. She was seen at urgent care. She was diagnosed with eustachian tube dysfunction. She was given amoxicillin which did not change her symptoms. Her pain did improve but then over the past week return. She complains of pain that radiates down the jaw. It is intermittent. It appears that the symptoms may have reappeared. She denies any fevers. No significant nasal congestion. Uses Flonase for allergies as well as for her ear initially.    Symptoms referable to hip joint  She also states since Friday approximately 3 days she's had a pulsating sound when she is sitting. States initially she thought that symptoms were secondary to a heater there is no heater around. Feels like there is a buzzing in her hips. She denies any recent trauma. Does complain of a chronic history of low back issues. Denies any issues with abdominal pain or pelvic pain. Denies any issues with walking. No constipation or diarrhea. States when she lies down. There is no symptoms.       Current medicines (including changes today)  Current Outpatient Prescriptions   Medication Sig Dispense Refill   • fluticasone (FLONASE) 50 MCG/ACT nasal spray Spray 2 Sprays in nose every day. 16 g 3     No current facility-administered medications for this visit.      She  has a past medical history of Acne rosacea (9/4/2011); History of tobacco use (9/4/2011); Hyperlipidemia (6/8/2011); and Metabolic syndrome (9/4/2011).    Social History and Family History were reviewed and updated.    ROS   No chest pain, no shortness of breath, no abdominal pain and all other systems were reviewed and are negative.       Objective:     Blood pressure 116/74, pulse 74, temperature 37.6 °C (99.6 °F),  "resp. rate 14, height 1.562 m (5' 1.5\"), weight 72.6 kg (160 lb), last menstrual period 02/12/2018, SpO2 96 %, not currently breastfeeding. Body mass index is 29.74 kg/m².   Physical Exam:  Constitutional: Alert, no distress.  Skin: Warm, dry, good turgor, no rashes in visible areas.  Eye: Equal, round and reactive, conjunctiva clear, lids normal.  ENMT: Lips without lesions, good dentition, oropharynx clear. Right ear with cerumen noted. Lavage performed by Marlena was successful. Left TM is clear.  Neck: Trachea midline, no masses.   Lymph: No cervical or supraclavicular lymphadenopathy  Respiratory: Unlabored respiratory effort, lungs clear to auscultation, no wheezes, no ronchi.  Cardiovascular: Normal S1, S2, no murmur, no edema.  Muscle skeletal: Lower range of motion good.  Psych: Alert and oriented x3, normal affect and mood.        Assessment and Plan:   The following treatment plan was discussed    1. Symptoms referable to hip joint  Acute, new onset condition. Etiology unknown. Could be referred symptoms of sciatica.    2. Right ear pain  Acute, new onset condition. Unfortunately she left prior to me reviewing her symptoms after the lavage. Possibly the wax was secondary    3. Preventative health care  Provided order for mammogram. Contact for scheduling.  - MA-SCREEN MAMMO W/CAD-BILAT; Future    4. Encounter to establish   Will contact her later on today to discuss follow-up for her issues and possible referrals to the unit and throat for right ear concern.    Addendum: Left voice mail at the end the day regarding following up with the ear pain. She may call me up or contact me through my chart. Also discussed if the sensation in the hips doesn't improve to contact me.    Followup: No Follow-up on file.    Please note that this dictation was created using voice recognition software. I have made every reasonable attempt to correct obvious errors, but I expect that there are errors of grammar and possibly " content that I did not discover before finalizing the note.

## 2018-06-13 ENCOUNTER — SUPERVISING PHYSICIAN REVIEW (OUTPATIENT)
Dept: URGENT CARE | Facility: CLINIC | Age: 48
End: 2018-06-13

## 2018-06-13 ENCOUNTER — HOSPITAL ENCOUNTER (OUTPATIENT)
Facility: MEDICAL CENTER | Age: 48
End: 2018-06-13
Attending: NURSE PRACTITIONER
Payer: COMMERCIAL

## 2018-06-13 ENCOUNTER — OFFICE VISIT (OUTPATIENT)
Dept: URGENT CARE | Facility: CLINIC | Age: 48
End: 2018-06-13
Payer: COMMERCIAL

## 2018-06-13 VITALS
HEIGHT: 62 IN | BODY MASS INDEX: 29.44 KG/M2 | HEART RATE: 77 BPM | OXYGEN SATURATION: 98 % | TEMPERATURE: 98.6 F | RESPIRATION RATE: 16 BRPM | SYSTOLIC BLOOD PRESSURE: 100 MMHG | WEIGHT: 160 LBS | DIASTOLIC BLOOD PRESSURE: 70 MMHG

## 2018-06-13 DIAGNOSIS — Z86.19 HISTORY OF CANDIDIASIS: ICD-10-CM

## 2018-06-13 DIAGNOSIS — R30.0 DYSURIA: ICD-10-CM

## 2018-06-13 DIAGNOSIS — N30.01 ACUTE CYSTITIS WITH HEMATURIA: ICD-10-CM

## 2018-06-13 DIAGNOSIS — N12 PYELONEPHRITIS: ICD-10-CM

## 2018-06-13 LAB
APPEARANCE UR: CLEAR
BILIRUB UR STRIP-MCNC: NORMAL MG/DL
COLOR UR AUTO: YELLOW
GLUCOSE UR STRIP.AUTO-MCNC: NORMAL MG/DL
KETONES UR STRIP.AUTO-MCNC: NORMAL MG/DL
LEUKOCYTE ESTERASE UR QL STRIP.AUTO: NORMAL
NITRITE UR QL STRIP.AUTO: NORMAL
PH UR STRIP.AUTO: 6 [PH] (ref 5–8)
PROT UR QL STRIP: NORMAL MG/DL
RBC UR QL AUTO: NORMAL
SP GR UR STRIP.AUTO: 1
UROBILINOGEN UR STRIP-MCNC: NORMAL MG/DL

## 2018-06-13 PROCEDURE — 87186 SC STD MICRODIL/AGAR DIL: CPT

## 2018-06-13 PROCEDURE — 81002 URINALYSIS NONAUTO W/O SCOPE: CPT | Performed by: NURSE PRACTITIONER

## 2018-06-13 PROCEDURE — 99214 OFFICE O/P EST MOD 30 MIN: CPT | Performed by: NURSE PRACTITIONER

## 2018-06-13 PROCEDURE — 87077 CULTURE AEROBIC IDENTIFY: CPT

## 2018-06-13 PROCEDURE — 87086 URINE CULTURE/COLONY COUNT: CPT

## 2018-06-13 RX ORDER — SULFAMETHOXAZOLE AND TRIMETHOPRIM 800; 160 MG/1; MG/1
1 TABLET ORAL 2 TIMES DAILY
Qty: 14 TAB | Refills: 0 | Status: SHIPPED | OUTPATIENT
Start: 2018-06-13 | End: 2018-06-20

## 2018-06-13 RX ORDER — FLUCONAZOLE 150 MG/1
150 TABLET ORAL DAILY
Qty: 1 TAB | Refills: 0 | Status: SHIPPED | OUTPATIENT
Start: 2018-06-13 | End: 2018-06-29

## 2018-06-13 RX ORDER — SULFAMETHOXAZOLE AND TRIMETHOPRIM 800; 160 MG/1; MG/1
1 TABLET ORAL EVERY 12 HOURS
Qty: 6 TAB | Refills: 0 | Status: CANCELLED | OUTPATIENT
Start: 2018-06-13 | End: 2018-06-16

## 2018-06-13 ASSESSMENT — ENCOUNTER SYMPTOMS
EMPTYING BLADDER: 1
MYALGIAS: 0
SORE THROAT: 0
EYE PAIN: 0
FEVER: 0
CHILLS: 0
VOMITING: 0
NAUSEA: 0
DIZZINESS: 0
FLANK PAIN: 1
SHORTNESS OF BREATH: 0

## 2018-06-13 ASSESSMENT — PATIENT HEALTH QUESTIONNAIRE - PHQ9: CLINICAL INTERPRETATION OF PHQ2 SCORE: 0

## 2018-06-13 NOTE — PROGRESS NOTES
"Subjective:   Samantha Baker is a 47 y.o. female who presents for Cystitis (frequency, cramps and lower back pain, x3days)        Cystitis    This is a new problem. Episode onset: 3 days. The problem occurs every urination. The problem has been unchanged. The quality of the pain is described as aching and burning. The pain is at a severity of 4/10. The pain is mild. There has been no fever. She is sexually active. There is a history of pyelonephritis. Associated symptoms include flank pain and frequency. Pertinent negatives include no chills, discharge, hematuria, hesitancy, nausea, urgency or vomiting. She has tried nothing for the symptoms. The treatment provided no relief. There is no history of kidney stones or recurrent UTIs.     Review of Systems   Constitutional: Negative for chills and fever.   HENT: Negative for sore throat.    Eyes: Negative for pain.   Respiratory: Negative for shortness of breath.    Cardiovascular: Negative for chest pain.   Gastrointestinal: Negative for nausea and vomiting.   Genitourinary: Positive for dysuria, flank pain and frequency. Negative for hematuria, hesitancy and urgency.   Musculoskeletal: Negative for myalgias.   Skin: Negative for rash.   Neurological: Negative for dizziness.     Allergies   Allergen Reactions   • Macrobid [Nitrofurantoin] Itching      Objective:   /70   Pulse 77   Temp 37 °C (98.6 °F)   Resp 16   Ht 1.562 m (5' 1.5\")   Wt 72.6 kg (160 lb)   SpO2 98%   Breastfeeding? No   BMI 29.74 kg/m²   Physical Exam   Constitutional: She is oriented to person, place, and time. She appears well-developed and well-nourished. No distress.   HENT:   Head: Normocephalic and atraumatic.   Eyes: Conjunctivae and EOM are normal. Pupils are equal, round, and reactive to light.   Cardiovascular: Normal rate and regular rhythm.    No murmur heard.  Pulmonary/Chest: Effort normal and breath sounds normal. No respiratory distress.   Abdominal: Soft. She " exhibits no distension. There is tenderness in the suprapubic area. There is CVA tenderness (mild tenderness noted bilaterally ).   Neurological: She is alert and oriented to person, place, and time. She has normal reflexes. No sensory deficit.   Skin: Skin is warm and dry.   Psychiatric: She has a normal mood and affect.         Assessment/Plan:   Assessment    1. Acute cystitis with hematuria  2. Dysuria  3. Pyelonephritis  - POCT Urinalysis  - Urine Culture; Future  - sulfamethoxazole-trimethoprim (BACTRIM DS) 800-160 MG tablet; Take 1 Tab by mouth 2 times a day for 7 days.  Dispense: 14 Tab; Refill: 0  - Urine Culture; Future  Patient without fevers, mild CVA tenderness noted patient with history of pyelonephritis. We will treat for her with 7 days of Bactrim. Advised patient if symptoms not improved and/or worsen she will need to return to clinic for an injection of Rocephin at this time.    Pt. Was given ABX therapy today and will change therapy if culture indicates this is necessary. ER precautions given- worsening symptoms, back pain, abd. Pain, or fevers.   Pt. Is to increase fluids, and take the complete duration of the therapy.   Pt. Understands and agrees with the plan.   F/U with PCP in 3-4 days as needed.     4. History of candidiasis  - fluconazole (DIFLUCAN) 150 MG tablet; Take 1 Tab by mouth every day.  Dispense: 1 Tab; Refill: 0    Differential diagnosis, natural history, supportive care, and indications for immediate follow-up discussed.

## 2018-06-16 LAB
BACTERIA UR CULT: ABNORMAL
BACTERIA UR CULT: ABNORMAL
SIGNIFICANT IND 70042: ABNORMAL
SITE SITE: ABNORMAL
SOURCE SOURCE: ABNORMAL

## 2018-06-29 ENCOUNTER — OFFICE VISIT (OUTPATIENT)
Dept: MEDICAL GROUP | Facility: MEDICAL CENTER | Age: 48
End: 2018-06-29
Payer: COMMERCIAL

## 2018-06-29 VITALS
TEMPERATURE: 98.6 F | SYSTOLIC BLOOD PRESSURE: 110 MMHG | RESPIRATION RATE: 16 BRPM | OXYGEN SATURATION: 98 % | HEART RATE: 83 BPM | WEIGHT: 162 LBS | HEIGHT: 61 IN | DIASTOLIC BLOOD PRESSURE: 72 MMHG | BODY MASS INDEX: 30.58 KG/M2

## 2018-06-29 DIAGNOSIS — E66.9 OBESITY (BMI 30-39.9): ICD-10-CM

## 2018-06-29 DIAGNOSIS — Z12.31 SCREENING MAMMOGRAM, ENCOUNTER FOR: ICD-10-CM

## 2018-06-29 DIAGNOSIS — Z00.00 ANNUAL PHYSICAL EXAM: ICD-10-CM

## 2018-06-29 DIAGNOSIS — R53.82 CHRONIC FATIGUE: ICD-10-CM

## 2018-06-29 PROCEDURE — 99214 OFFICE O/P EST MOD 30 MIN: CPT | Performed by: NURSE PRACTITIONER

## 2018-06-29 NOTE — PROGRESS NOTES
"Samantha Baker is a 47 y.o. female here to establish care and discuss the following:    HPI:    Chronic fatigue  Started about a year ago after starting new job. Has been under more stress. Usually starts about 3pm daily. Thinks it may be related to lowering her calorie intake to about 1350 per day. Bfast having omelet or eggs and meat, morning snack, turkey and veg, lunch chicken salad, afternoon snack chicken and veg occasional carb, protein and vegetables.     Current medicines (including changes today)  No current outpatient prescriptions on file.     No current facility-administered medications for this visit.      She  has a past medical history of Acne rosacea (2011); History of tobacco use (2011); Hyperlipidemia (2011); and Metabolic syndrome (2011).  She  has a past surgical history that includes orly by laparoscopy (08) and cervical disk and fusion anterior (3/8/2016).  Social History   Substance Use Topics   • Smoking status: Former Smoker     Packs/day: 1.00     Years: 10.00     Types: Cigarettes     Quit date: 2008   • Smokeless tobacco: Never Used   • Alcohol use No      Comment: sober 5 years     Social History     Social History Narrative   • No narrative on file     Family History   Problem Relation Age of Onset   • Heart Attack Maternal Grandfather      d. 40s   • Cancer Maternal Aunt      Lung   • No Known Problems Mother    • No Known Problems Father    • No Known Problems Sister    • No Known Problems Brother      Family Status   Relation Status   • Maternal Grandfather     MI   • Maternal Aunt    • Mother Alive   • Father Alive   • Sister Alive   • Brother Alive         ROS  No chest pain, no abdominal pain, no rash.  Positive ROS as per HPI.  All other systems reviewed and are negative      Objective:     Blood pressure 110/72, pulse 83, temperature 37 °C (98.6 °F), resp. rate 16, height 1.562 m (5' 1.5\"), weight 73.5 kg (162 lb), last menstrual period " 06/24/2018, SpO2 98 %, not currently breastfeeding. Body mass index is 30.12 kg/m².     Physical Exam:    Constitutional: Alert, no distress.  Skin: Warm, dry, good turgor, no rashes in visible areas.  Eye: Equal, round and reactive, conjunctiva clear, lids normal.  ENMT: Lips without lesions, good dentition, oropharynx clear.  Neck: Trachea midline, no masses, no thyromegaly. No cervical or supraclavicular lymphadenopathy.  Respiratory: Unlabored respiratory effort, lungs clear to auscultation, no wheezes, no ronchi.  Cardiovascular: Normal S1, S2, no murmur, no edema.  Abdomen: Soft, non-tender, no masses, no hepatosplenomegaly.  Psych: Alert and oriented x3, normal affect and mood.        Assessment and Plan:   The following treatment plan was discussed    1. Annual physical exam  Check labs, follow-up for annual  - CBC WITH DIFFERENTIAL; Future  - COMP METABOLIC PANEL; Future  - HEMOGLOBIN A1C; Future  - LIPID PROFILE; Future  - TSH WITH REFLEX TO FT4; Future  - VITAMIN D,25 HYDROXY; Future  - VITAMIN B12; Future    2. Chronic fatigue  Unstable  Check labs  - HEMOGLOBIN A1C; Future  - TSH WITH REFLEX TO FT4; Future  - VITAMIN D,25 HYDROXY; Future  - VITAMIN B12; Future    3. Screening mammogram, encounter for  Mammogram ordered  - MA-SCREEN MAMMO W/CAD-BILAT; Future    4. Obesity (BMI 30-39.9)  Check A1c  - HEMOGLOBIN A1C; Future  - Patient identified as having weight management issue.  Appropriate orders and counseling given.      Records requested.  Followup: Return in about 4 weeks (around 7/27/2018) for Annual, labs.    I have placed the below orders and discussed them with an approved delegating provider. The MA is performing the below orders under the direction of Dr. Guillermo

## 2018-06-29 NOTE — ASSESSMENT & PLAN NOTE
Started about a year ago after starting new job. Has been under more stress. Usually starts about 3pm daily. Thinks it may be related to lowering her calorie intake to about 1350 per day. Bfast having omelet or eggs and meat, morning snack, turkey and veg, lunch chicken salad, afternoon snack chicken and veg occasional carb, protein and vegetables.

## 2018-07-02 ENCOUNTER — HOSPITAL ENCOUNTER (OUTPATIENT)
Dept: LAB | Facility: MEDICAL CENTER | Age: 48
End: 2018-07-02
Attending: NURSE PRACTITIONER
Payer: COMMERCIAL

## 2018-07-02 DIAGNOSIS — R53.82 CHRONIC FATIGUE: ICD-10-CM

## 2018-07-02 DIAGNOSIS — Z00.00 ANNUAL PHYSICAL EXAM: ICD-10-CM

## 2018-07-02 DIAGNOSIS — E66.9 OBESITY (BMI 30-39.9): ICD-10-CM

## 2018-07-02 LAB
25(OH)D3 SERPL-MCNC: 18 NG/ML (ref 30–100)
ALBUMIN SERPL BCP-MCNC: 4 G/DL (ref 3.2–4.9)
ALBUMIN/GLOB SERPL: 1.4 G/DL
ALP SERPL-CCNC: 50 U/L (ref 30–99)
ALT SERPL-CCNC: 21 U/L (ref 2–50)
ANION GAP SERPL CALC-SCNC: 7 MMOL/L (ref 0–11.9)
AST SERPL-CCNC: 17 U/L (ref 12–45)
BASOPHILS # BLD AUTO: 2.3 % (ref 0–1.8)
BASOPHILS # BLD: 0.12 K/UL (ref 0–0.12)
BILIRUB SERPL-MCNC: 0.6 MG/DL (ref 0.1–1.5)
BUN SERPL-MCNC: 21 MG/DL (ref 8–22)
CALCIUM SERPL-MCNC: 9.3 MG/DL (ref 8.5–10.5)
CHLORIDE SERPL-SCNC: 108 MMOL/L (ref 96–112)
CHOLEST SERPL-MCNC: 257 MG/DL (ref 100–199)
CO2 SERPL-SCNC: 26 MMOL/L (ref 20–33)
CREAT SERPL-MCNC: 0.63 MG/DL (ref 0.5–1.4)
EOSINOPHIL # BLD AUTO: 0.48 K/UL (ref 0–0.51)
EOSINOPHIL NFR BLD: 9.1 % (ref 0–6.9)
ERYTHROCYTE [DISTWIDTH] IN BLOOD BY AUTOMATED COUNT: 44.4 FL (ref 35.9–50)
EST. AVERAGE GLUCOSE BLD GHB EST-MCNC: 111 MG/DL
GLOBULIN SER CALC-MCNC: 2.9 G/DL (ref 1.9–3.5)
GLUCOSE SERPL-MCNC: 97 MG/DL (ref 65–99)
HBA1C MFR BLD: 5.5 % (ref 0–5.6)
HCT VFR BLD AUTO: 43 % (ref 37–47)
HDLC SERPL-MCNC: 64 MG/DL
HGB BLD-MCNC: 13.5 G/DL (ref 12–16)
IMM GRANULOCYTES # BLD AUTO: 0.01 K/UL (ref 0–0.11)
IMM GRANULOCYTES NFR BLD AUTO: 0.2 % (ref 0–0.9)
LDLC SERPL CALC-MCNC: 180 MG/DL
LYMPHOCYTES # BLD AUTO: 2.09 K/UL (ref 1–4.8)
LYMPHOCYTES NFR BLD: 39.8 % (ref 22–41)
MCH RBC QN AUTO: 29.7 PG (ref 27–33)
MCHC RBC AUTO-ENTMCNC: 31.4 G/DL (ref 33.6–35)
MCV RBC AUTO: 94.7 FL (ref 81.4–97.8)
MONOCYTES # BLD AUTO: 0.38 K/UL (ref 0–0.85)
MONOCYTES NFR BLD AUTO: 7.2 % (ref 0–13.4)
NEUTROPHILS # BLD AUTO: 2.17 K/UL (ref 2–7.15)
NEUTROPHILS NFR BLD: 41.4 % (ref 44–72)
NRBC # BLD AUTO: 0 K/UL
NRBC BLD-RTO: 0 /100 WBC
PLATELET # BLD AUTO: 262 K/UL (ref 164–446)
PMV BLD AUTO: 11.4 FL (ref 9–12.9)
POTASSIUM SERPL-SCNC: 4 MMOL/L (ref 3.6–5.5)
PROT SERPL-MCNC: 6.9 G/DL (ref 6–8.2)
RBC # BLD AUTO: 4.54 M/UL (ref 4.2–5.4)
SODIUM SERPL-SCNC: 141 MMOL/L (ref 135–145)
TRIGL SERPL-MCNC: 67 MG/DL (ref 0–149)
TSH SERPL DL<=0.005 MIU/L-ACNC: 1.98 UIU/ML (ref 0.38–5.33)
VIT B12 SERPL-MCNC: 630 PG/ML (ref 211–911)
WBC # BLD AUTO: 5.3 K/UL (ref 4.8–10.8)

## 2018-07-02 PROCEDURE — 82306 VITAMIN D 25 HYDROXY: CPT

## 2018-07-02 PROCEDURE — 84443 ASSAY THYROID STIM HORMONE: CPT

## 2018-07-02 PROCEDURE — 80053 COMPREHEN METABOLIC PANEL: CPT

## 2018-07-02 PROCEDURE — 80061 LIPID PANEL: CPT

## 2018-07-02 PROCEDURE — 83036 HEMOGLOBIN GLYCOSYLATED A1C: CPT

## 2018-07-02 PROCEDURE — 85025 COMPLETE CBC W/AUTO DIFF WBC: CPT

## 2018-07-02 PROCEDURE — 36415 COLL VENOUS BLD VENIPUNCTURE: CPT

## 2018-07-02 PROCEDURE — 82607 VITAMIN B-12: CPT

## 2018-07-03 ENCOUNTER — TELEPHONE (OUTPATIENT)
Dept: MEDICAL GROUP | Facility: MEDICAL CENTER | Age: 48
End: 2018-07-03

## 2018-07-03 NOTE — TELEPHONE ENCOUNTER
----- Message from CATHERINE Najera sent at 7/2/2018 10:50 PM PDT -----  Will discuss lab results at future appointment.   CATHERINE Najera

## 2018-07-12 ENCOUNTER — TELEPHONE (OUTPATIENT)
Dept: MEDICAL GROUP | Facility: MEDICAL CENTER | Age: 48
End: 2018-07-12

## 2018-07-12 NOTE — TELEPHONE ENCOUNTER
1. Caller Name:  Pt                                         Call Back Number: [phone] (home)         Patient approves a detailed voicemail message: yes     Pt called and LVM stating that she has had multiple herniated disc in the past. She thinks she recently re-injuried her back. Her Neurosurgeon has left and she has no one to see. She is requesting a referral and some imaging either MRI or x-ray of back.

## 2018-07-13 NOTE — TELEPHONE ENCOUNTER
Please contact pt.  I'm unable to order imaging without seeing her.  I can refer her to a specialist but without seeing her it's difficult to know who.  Is there someone she'd like to see?  Maybe make appt with me or another PCP or go to  for an evaluation?  Thank you.

## 2018-07-30 ENCOUNTER — HOSPITAL ENCOUNTER (OUTPATIENT)
Facility: MEDICAL CENTER | Age: 48
End: 2018-07-30
Attending: NURSE PRACTITIONER
Payer: COMMERCIAL

## 2018-07-30 ENCOUNTER — OFFICE VISIT (OUTPATIENT)
Dept: MEDICAL GROUP | Facility: MEDICAL CENTER | Age: 48
End: 2018-07-30
Payer: COMMERCIAL

## 2018-07-30 VITALS
OXYGEN SATURATION: 99 % | WEIGHT: 164 LBS | TEMPERATURE: 97.3 F | DIASTOLIC BLOOD PRESSURE: 70 MMHG | HEIGHT: 62 IN | BODY MASS INDEX: 30.18 KG/M2 | SYSTOLIC BLOOD PRESSURE: 108 MMHG | HEART RATE: 85 BPM

## 2018-07-30 DIAGNOSIS — N89.8 VAGINAL ODOR: ICD-10-CM

## 2018-07-30 DIAGNOSIS — Z79.899 ENCOUNTER FOR MONITORING STATIN THERAPY: ICD-10-CM

## 2018-07-30 DIAGNOSIS — Z12.4 PAP SMEAR FOR CERVICAL CANCER SCREENING: ICD-10-CM

## 2018-07-30 DIAGNOSIS — Z51.81 ENCOUNTER FOR MONITORING STATIN THERAPY: ICD-10-CM

## 2018-07-30 DIAGNOSIS — E55.9 VITAMIN D DEFICIENCY: ICD-10-CM

## 2018-07-30 DIAGNOSIS — E78.00 PURE HYPERCHOLESTEROLEMIA: ICD-10-CM

## 2018-07-30 PROCEDURE — 87624 HPV HI-RISK TYP POOLED RSLT: CPT

## 2018-07-30 PROCEDURE — 87491 CHLMYD TRACH DNA AMP PROBE: CPT

## 2018-07-30 PROCEDURE — 88175 CYTOPATH C/V AUTO FLUID REDO: CPT

## 2018-07-30 PROCEDURE — 99396 PREV VISIT EST AGE 40-64: CPT | Performed by: NURSE PRACTITIONER

## 2018-07-30 PROCEDURE — 87480 CANDIDA DNA DIR PROBE: CPT

## 2018-07-30 PROCEDURE — 87510 GARDNER VAG DNA DIR PROBE: CPT

## 2018-07-30 PROCEDURE — 87660 TRICHOMONAS VAGIN DIR PROBE: CPT

## 2018-07-30 PROCEDURE — 87591 N.GONORRHOEAE DNA AMP PROB: CPT

## 2018-07-30 RX ORDER — SIMVASTATIN 10 MG
10 TABLET ORAL EVERY EVENING
Qty: 30 TAB | Refills: 11 | Status: SHIPPED | OUTPATIENT
Start: 2018-07-30 | End: 2019-01-15

## 2018-07-30 NOTE — PROGRESS NOTES
SUBJECTIVE: 48 y.o. female for annual routine gynecologic exam  Chief Complaint   Patient presents with   • Gynecologic Exam   • Follow-Up     lab results       Obstetric History       T0      L3     SAB1   TAB0   Ectopic0   Molar0   Multiple0   Live Births0       Last Pap: , abnormal  History   Sexual Activity   • Sexual activity: Yes   • Partners: Male     Comment: vasectomy     Sexual history: currently sexually active, single partner, contraceptive--vasectomy   H/O Abnormal Pap yes, unsure what  She  reports that she quit smoking about 10 years ago. Her smoking use included Cigarettes. She has a 10.00 pack-year smoking history. She has never used smokeless tobacco.        Allergies: Macrobid [nitrofurantoin]     ROS:    Reports no menopause symptoms of hot flashes, night sweats, sleep disruption, mood changes.Reports mild vaginal dryness.   Menses every month with 5 days moderate bleeding   Cramping is moderate.   She does not take OTC analgesics for cramping  No significant bloating/fluid retention, pelvic pain, or dyspareunia. No vaginal discharge   No breast tenderness, dimpling, mass, nipple discharge, skin changes, changes in size or contour, or abnormal cyclic discomfort.  No urinary tract symptoms, no incontinence, no polydipsia, polyuria,  No abdominal pain, change in bowel habits, black or bloody stools.    No unusual headaches, no visual changes, menstrual migraines   No prolonged cough. No dyspnea or chest pain on exertion.  No depression, labile mood, anxiety, libido changes, insomnia.  No temperature intolerance.  No new/concerning skin lesions, concerns.     Exercise: no regular exercise program  Preventive Care:Up to date    Current medicines (including changes today)  No current outpatient prescriptions on file.     No current facility-administered medications for this visit.      She  has a past medical history of Acne rosacea (2011); History of tobacco use (2011);  "Hyperlipidemia (6/8/2011); and Metabolic syndrome (9/4/2011).  She  has a past surgical history that includes orly by laparoscopy (11/27/08) and cervical disk and fusion anterior (3/8/2016).     Family History:   Family History   Problem Relation Age of Onset   • Heart Attack Maternal Grandfather         d. 40s   • Cancer Maternal Aunt         Lung   • No Known Problems Mother    • No Known Problems Father    • No Known Problems Sister    • No Known Problems Brother        OBJECTIVE:   /70   Pulse 85   Temp 36.3 °C (97.3 °F)   Ht 1.562 m (5' 1.5\")   Wt 74.4 kg (164 lb)   SpO2 99%   BMI 30.49 kg/m²   Body mass index is 30.49 kg/m².    General/Constitutional: Vitals as above, Well nourished, well developed female in no acute distress  HEAD AND NECK:  Ears normal.  Throat, oral cavity and tongue normal.  Neck supple. No adenopathy or masses in the neck or supraclavicular regions.  No carotid bruits. No thyromegaly.   PSYCH: Judgment grossly appropriate, no apparent depression/anxiety  NEURO: Cranial nerves are normal. DTR's normal and symmetric.    CHEST:  Clear, good air entry, no wheezes or rales.   HEART:  Regular rate and rhythm.  S1 and S2 normal.   No edema or JVD. ABDOMEN:  Soft without tenderness, guarding, mass or organomegaly.  No CVA tenderness or inguinal adenopathy.   Genitourinary: Grossly normal external female genitalia with skin within normal limits,   EXTREMITIES:  Extremities, reflexes and peripheral pulses are normal.   SKIN: color normal, vascularity normal, no edema, temperature normal   No rashes or suspicious skin lesions noted.     Breast Exam: Symmetrical, normal consistency without masses., No dimpling or skin changes, Normal nipples without discharge, no axillary lymphadenopathy  Pelvic Exam -  Normal external genitalia with no lesions. Vaginal Mucosa:  normal vaginal mucosa, no bleeding . no adnexal masses or tenderness, no cervical motion tenderness, no uterine tenderness, " small speculum used with warm water as lubrication, Cervix well visualized with no discharge/friability/bleeding, cervical broom used to obtain cervical specimen, post-specimen collection demonstrated slight bleeding, normal cervix with no visible lesions. Thin Prep Pap is obtained, vaginal swab is obtained and specimen(s) sent to lab  Rectal: deferred    <ASSESSMENT and PLAN>  1. Pap smear for cervical cancer screening  Pap complete.  Increased discharge and vaginal odor noted.  Patient previously had multiple sexual partners, will check STD on Pap.  THINPREP PAP W/HPV AND CTNG   2. Vitamin D deficiency  Unstable.  Advised starting vitamin D 3 1000 2090 daily.   3. Pure hypercholesterolemia   unstable.  Discussed options of dietary changes and rechecking lipids in 3 months versus starting medication today.  Patient states she has been under a lot of stress and has been eating very poorly, she does not see that she will be making many dietary changes at this moment but plans to in the future.  She is agreeable to starting a cholesterol medication today.  Begin simvastatin 10 mg daily.  simvastatin (ZOCOR) 10 MG Tab    Recheck lipids in 3 months.  LIPID PROFILE   4. Encounter for monitoring statin therapy   recheck lipids and CMP in 3 months.  LIPID PROFILE    COMP METABOLIC PANEL   5. Vaginal odor  Unstable.  Check vaginal pathogens.  VAGINAL PATHOGENS DNA PANEL       Discussed  breast self exam, adequate intake of calcium and vitamin D, diet and exercise   Follow-up in 1 years for next Gyn exam and 5 years for next Pap.   Next office visit for recheck of chronic medical conditions is due in 3 months

## 2018-07-30 NOTE — PATIENT INSTRUCTIONS
Vitamin D3 9855-6606 IU daily    Some dietary changes you can make to improve cholesterol are:    -Add healthy fats to your diet such as avocados, nuts (walnuts/almonds), fish high in omega 3's (trout, salmon, albacore, halibut)    -Increase dietary fiber such as whole grain bread, oatmeal,  oats, oat bran, barley, peas, yams, sweet potatoes and other potatoes, as well as legumes or beans, such as zaman beans, black beans, garbanzo beans, and peas. Vegetables rich in soluble fiber include carrots, [place] sprouts, beets, okra, and eggplant.      -Decrease trans fats and saturated fats, these are found in butter, red meat, dairy, organ meats, and shellfish.      -Decrease sugar and carbohydrate intake, including alcohol.     -Exercise and weight loss as well.

## 2018-07-31 DIAGNOSIS — N89.8 VAGINAL ODOR: ICD-10-CM

## 2018-07-31 DIAGNOSIS — Z12.4 PAP SMEAR FOR CERVICAL CANCER SCREENING: ICD-10-CM

## 2018-07-31 LAB
CANDIDA DNA VAG QL PROBE+SIG AMP: NEGATIVE
G VAGINALIS DNA VAG QL PROBE+SIG AMP: POSITIVE
T VAGINALIS DNA VAG QL PROBE+SIG AMP: NEGATIVE

## 2018-08-02 LAB
C TRACH DNA GENITAL QL NAA+PROBE: NEGATIVE
CYTOLOGY REG CYTOL: ABNORMAL
HPV HR 12 DNA CVX QL NAA+PROBE: POSITIVE
HPV16 DNA SPEC QL NAA+PROBE: NEGATIVE
HPV18 DNA SPEC QL NAA+PROBE: NEGATIVE
N GONORRHOEA DNA GENITAL QL NAA+PROBE: NEGATIVE
SPECIMEN SOURCE: ABNORMAL
SPECIMEN SOURCE: ABNORMAL

## 2018-08-06 DIAGNOSIS — R87.618 UNEXPLAINED ENDOMETRIAL CELLS ON CERVICAL PAP SMEAR: ICD-10-CM

## 2018-08-08 ENCOUNTER — HOSPITAL ENCOUNTER (OUTPATIENT)
Dept: RADIOLOGY | Facility: MEDICAL CENTER | Age: 48
End: 2018-08-08

## 2018-08-14 ENCOUNTER — HOSPITAL ENCOUNTER (OUTPATIENT)
Dept: RADIOLOGY | Facility: MEDICAL CENTER | Age: 48
End: 2018-08-14
Attending: NURSE PRACTITIONER
Payer: COMMERCIAL

## 2018-08-14 DIAGNOSIS — Z12.31 SCREENING MAMMOGRAM, ENCOUNTER FOR: ICD-10-CM

## 2018-08-14 DIAGNOSIS — R87.618 UNEXPLAINED ENDOMETRIAL CELLS ON CERVICAL PAP SMEAR: ICD-10-CM

## 2018-08-14 PROCEDURE — 76830 TRANSVAGINAL US NON-OB: CPT

## 2018-08-14 PROCEDURE — 77067 SCR MAMMO BI INCL CAD: CPT

## 2018-08-15 DIAGNOSIS — R93.89 THICKENED ENDOMETRIUM: ICD-10-CM

## 2018-10-04 ENCOUNTER — GYNECOLOGY VISIT (OUTPATIENT)
Dept: OBGYN | Facility: MEDICAL CENTER | Age: 48
End: 2018-10-04
Payer: COMMERCIAL

## 2018-10-04 VITALS
BODY MASS INDEX: 30.58 KG/M2 | DIASTOLIC BLOOD PRESSURE: 70 MMHG | HEIGHT: 61 IN | WEIGHT: 162 LBS | SYSTOLIC BLOOD PRESSURE: 110 MMHG

## 2018-10-04 DIAGNOSIS — B97.7 HPV IN FEMALE: ICD-10-CM

## 2018-10-04 DIAGNOSIS — R93.89 THICKENED ENDOMETRIUM: ICD-10-CM

## 2018-10-04 PROCEDURE — 99202 OFFICE O/P NEW SF 15 MIN: CPT | Performed by: OBSTETRICS & GYNECOLOGY

## 2018-10-04 NOTE — NON-PROVIDER
Pt here to discuss pap results and New patient appointment  Pt states no complaints  Good # [phone]  Pharmacy verified.

## 2018-10-04 NOTE — PROGRESS NOTES
Chief Complaint   Patient presents with   • Gynecologic Exam     New patient   CC: abnormal PAP    History of present illness:   48 y.o.  presents with above chief complaint. Pt had PAP done at PMD, here for consultation  Normal PAP with +HPV (high risk, not 16 or 18).  Had Us that showed endometrium 1.7 cm. No bleeding between cycles, she does report heavy VB for 4 days during cycle  ROS: Pertinent positives documented in HPI and all other systems reviewed & are negative    POBHx: ,     PGYNHx: abnormal PAP, last pap Aug 2018    All PMH, PSH, meds, allergies, social history and FH reviewed and updated today:  Past Medical History:   Diagnosis Date   • Acne rosacea 2011   • Hemorrhoids    • History of tobacco use 2011   • Hyperlipidemia 2011   • Metabolic syndrome 2011       Past Surgical History:   Procedure Laterality Date   • CERVICAL DISK AND FUSION ANTERIOR  3/8/2016    Procedure: CERVICAL DISK AND FUSION ANTERIOR C6-7;  Surgeon: Jonas Ramos M.D.;  Location: SURGERY [facility];  Service:    • GM BY LAPAROSCOPY  08    Performed by LUCAS HILL at SURGERY [facility]       Allergies:   Allergies   Allergen Reactions   • Macrobid [Nitrofurantoin] Itching       Social History     Social History   • Marital status:      Spouse name: N/A   • Number of children: N/A   • Years of education: N/A     Occupational History   • Not on file.     Social History Main Topics   • Smoking status: Former Smoker     Packs/day: 1.00     Years: 10.00     Types: Cigarettes     Quit date: 2008   • Smokeless tobacco: Never Used   • Alcohol use No      Comment: sober 5 years   • Drug use: No   • Sexual activity: Yes     Partners: Male      Comment: vasectomy     Other Topics Concern   • Not on file     Social History Narrative   • No narrative on file       Family History   Problem Relation Age of Onset   • Heart Attack Maternal Grandfather         d. 40s   • Cancer  "Maternal Aunt         Lung   • No Known Problems Mother    • No Known Problems Father    • No Known Problems Sister    • No Known Problems Brother        Physical exam:  Blood pressure 110/70, height 1.562 m (5' 1.5\"), weight 73.5 kg (162 lb), last menstrual period 09/06/2018, not currently breastfeeding.    GENERAL APPEARANCE: healthy, alert, no distress  NEURO Awake, alert and oriented x 3, Normal gait, no sensory deficits  SKIN No rashes, or ulcers or lesions seen  PSYCHIATRIC: Patient shows appropriate affect, is alert and oriented x3, intact judgment and insight.      Assessment:  1. Thickened endometrium  US-OB TRANSVAGINAL ONLY   2. HPV in female         Plan:    D/W pt POC according to ASCCP guidelines    Normal PAP with HPV (not 16 or 18) is followed by repeat PAP with cotesting in 1 year (august 2019).  All questions answered.  D/W pt common progression of HPV as well    Will repeat US after her next cycle to assess if original thickening was due to normal endometrium prior to menses or due to other pathology.  If US is performed just after her cycle, it should be thin    Spent 20 minutes with pt, with the entire time dedicated to education, counseling and coordination of care  "

## 2018-12-13 ENCOUNTER — OFFICE VISIT (OUTPATIENT)
Dept: MEDICAL GROUP | Facility: MEDICAL CENTER | Age: 48
End: 2018-12-13
Payer: COMMERCIAL

## 2018-12-13 ENCOUNTER — EH NON-PROVIDER (OUTPATIENT)
Dept: OCCUPATIONAL MEDICINE | Facility: CLINIC | Age: 48
End: 2018-12-13

## 2018-12-13 ENCOUNTER — HOSPITAL ENCOUNTER (OUTPATIENT)
Facility: MEDICAL CENTER | Age: 48
End: 2018-12-13
Attending: INTERNAL MEDICINE
Payer: COMMERCIAL

## 2018-12-13 ENCOUNTER — EMPLOYEE HEALTH (OUTPATIENT)
Dept: OCCUPATIONAL MEDICINE | Facility: CLINIC | Age: 48
End: 2018-12-13

## 2018-12-13 ENCOUNTER — HOSPITAL ENCOUNTER (OUTPATIENT)
Facility: MEDICAL CENTER | Age: 48
End: 2018-12-13
Attending: FAMILY MEDICINE
Payer: COMMERCIAL

## 2018-12-13 ENCOUNTER — HOSPITAL ENCOUNTER (OUTPATIENT)
Dept: LAB | Facility: MEDICAL CENTER | Age: 48
End: 2018-12-13
Attending: INTERNAL MEDICINE
Payer: COMMERCIAL

## 2018-12-13 VITALS
OXYGEN SATURATION: 98 % | TEMPERATURE: 98 F | DIASTOLIC BLOOD PRESSURE: 74 MMHG | HEIGHT: 62 IN | BODY MASS INDEX: 25.76 KG/M2 | SYSTOLIC BLOOD PRESSURE: 118 MMHG | RESPIRATION RATE: 14 BRPM | HEART RATE: 70 BPM | WEIGHT: 140 LBS

## 2018-12-13 VITALS
OXYGEN SATURATION: 94 % | HEIGHT: 62 IN | SYSTOLIC BLOOD PRESSURE: 112 MMHG | DIASTOLIC BLOOD PRESSURE: 68 MMHG | HEART RATE: 83 BPM | BODY MASS INDEX: 26.31 KG/M2 | TEMPERATURE: 98.2 F | WEIGHT: 143 LBS

## 2018-12-13 DIAGNOSIS — R30.0 DYSURIA: ICD-10-CM

## 2018-12-13 DIAGNOSIS — Z02.89 EXAMINATION, PHYSICAL, EMPLOYEE: ICD-10-CM

## 2018-12-13 DIAGNOSIS — R10.9 FLANK PAIN: ICD-10-CM

## 2018-12-13 DIAGNOSIS — Z02.89 VISIT FOR OCCUPATIONAL HEALTH EXAMINATION: ICD-10-CM

## 2018-12-13 DIAGNOSIS — Z02.89 VISIT FOR OCCUPATIONAL HEALTH EXAMINATION: Primary | ICD-10-CM

## 2018-12-13 DIAGNOSIS — Z02.1 PRE-EMPLOYMENT DRUG SCREENING: ICD-10-CM

## 2018-12-13 LAB
AMP AMPHETAMINE: NORMAL
ANION GAP SERPL CALC-SCNC: 7 MMOL/L (ref 0–11.9)
APPEARANCE UR: CLEAR
BAR BARBITURATES: NORMAL
BILIRUB UR STRIP-MCNC: NORMAL MG/DL
BUN SERPL-MCNC: 19 MG/DL (ref 8–22)
BZO BENZODIAZEPINES: NORMAL
CALCIUM SERPL-MCNC: 10.3 MG/DL (ref 8.5–10.5)
CHLORIDE SERPL-SCNC: 105 MMOL/L (ref 96–112)
CO2 SERPL-SCNC: 28 MMOL/L (ref 20–33)
COC COCAINE: NORMAL
COLOR UR AUTO: YELLOW
CREAT SERPL-MCNC: 0.49 MG/DL (ref 0.5–1.4)
GLUCOSE SERPL-MCNC: 89 MG/DL (ref 65–99)
GLUCOSE UR STRIP.AUTO-MCNC: NORMAL MG/DL
INT CON NEG: NORMAL
INT CON POS: NORMAL
KETONES UR STRIP.AUTO-MCNC: NORMAL MG/DL
LEUKOCYTE ESTERASE UR QL STRIP.AUTO: NORMAL
MDMA ECSTASY: NORMAL
MET METHAMPHETAMINES: NORMAL
MTD METHADONE: NORMAL
NITRITE UR QL STRIP.AUTO: NORMAL
OPI OPIATES: NORMAL
OXY OXYCODONE: NORMAL
PCP PHENCYCLIDINE: NORMAL
PH UR STRIP.AUTO: 6 [PH] (ref 5–8)
POC URINE DRUG SCREEN OCDRS: NORMAL
POTASSIUM SERPL-SCNC: 3.7 MMOL/L (ref 3.6–5.5)
PROT UR QL STRIP: NORMAL MG/DL
RBC UR QL AUTO: NORMAL
SODIUM SERPL-SCNC: 140 MMOL/L (ref 135–145)
SP GR UR STRIP.AUTO: 1.01
THC: NORMAL
UROBILINOGEN UR STRIP-MCNC: 0.2 MG/DL

## 2018-12-13 PROCEDURE — 86480 TB TEST CELL IMMUN MEASURE: CPT | Performed by: PREVENTIVE MEDICINE

## 2018-12-13 PROCEDURE — 80305 DRUG TEST PRSMV DIR OPT OBS: CPT | Performed by: FAMILY MEDICINE

## 2018-12-13 PROCEDURE — 99213 OFFICE O/P EST LOW 20 MIN: CPT | Performed by: INTERNAL MEDICINE

## 2018-12-13 PROCEDURE — 80048 BASIC METABOLIC PNL TOTAL CA: CPT

## 2018-12-13 PROCEDURE — 90686 IIV4 VACC NO PRSV 0.5 ML IM: CPT | Performed by: PREVENTIVE MEDICINE

## 2018-12-13 PROCEDURE — 87086 URINE CULTURE/COLONY COUNT: CPT

## 2018-12-13 PROCEDURE — 8915 PR COMPREHENSIVE PHYSICAL: Performed by: FAMILY MEDICINE

## 2018-12-13 PROCEDURE — 81002 URINALYSIS NONAUTO W/O SCOPE: CPT | Performed by: INTERNAL MEDICINE

## 2018-12-13 PROCEDURE — 86735 MUMPS ANTIBODY: CPT | Performed by: PREVENTIVE MEDICINE

## 2018-12-13 PROCEDURE — 86787 VARICELLA-ZOSTER ANTIBODY: CPT | Performed by: PREVENTIVE MEDICINE

## 2018-12-13 PROCEDURE — 86765 RUBEOLA ANTIBODY: CPT | Performed by: PREVENTIVE MEDICINE

## 2018-12-13 PROCEDURE — 86762 RUBELLA ANTIBODY: CPT | Performed by: PREVENTIVE MEDICINE

## 2018-12-13 PROCEDURE — 36415 COLL VENOUS BLD VENIPUNCTURE: CPT

## 2018-12-13 NOTE — PROGRESS NOTES
"CC:Dysuria, flank pain.    HPI:   Samantha presents today with the following.    1. Dysuria/Flank pain  Presents complaining of initial dysuria approximately 2 weeks ago.  She reports she hydrate herself well and the symptoms of frequent urination went away.  She developed some low back pain.  She has no fevers or chills not noted any blood in her urine.  Pain is fairly constant in nature 5 out of 10 intensity is better today than it was yesterday.  Does not radiate to the groin again she has no pain with urination.  No fevers or chills no nausea vomiting no other localizing infectious symptoms.      Patient Active Problem List    Diagnosis Date Noted   • Thickened endometrium 10/04/2018   • HPV in female 10/04/2018   • Vitamin D deficiency 07/30/2018   • Encounter for monitoring statin therapy 07/30/2018   • Vaginal odor 07/30/2018   • Chronic fatigue 06/29/2018   • Obesity (BMI 30-39.9) 06/29/2018   • Symptoms referable to hip joint 03/05/2018   • Right ear pain 03/05/2018   • Herpes simplex infection of genitourinary system 10/17/2016   • Seasonal allergic rhinitis due to pollen 10/17/2016   • Herpes labialis 03/13/2014   • History of tobacco use 09/04/2011   • Hyperlipidemia 06/08/2011       Current Outpatient Prescriptions   Medication Sig Dispense Refill   • simvastatin (ZOCOR) 10 MG Tab Take 1 Tab by mouth every evening. (Patient not taking: Reported on 12/13/2018) 30 Tab 11     No current facility-administered medications for this visit.          Allergies as of 12/13/2018 - Reviewed 12/13/2018   Allergen Reaction Noted   • Macrobid [nitrofurantoin] Itching 09/27/2017        ROS: Denies Chest pain, SOB, LE edema.    /68 (BP Location: Left arm, Patient Position: Sitting)   Pulse 83   Temp 36.8 °C (98.2 °F)   Ht 1.575 m (5' 2\")   Wt 64.9 kg (143 lb)   SpO2 94%   BMI 26.16 kg/m²     Physical Exam:  Gen:         Alert and oriented, No apparent distress.  Neck:        No Lymphadenopathy or " Bruits.  Lungs:     Clear to auscultation bilaterally  CV:          Regular rate and rhythm. No murmurs, rubs or gallops.               Ext:          No clubbing, cyanosis, edema.      Assessment and Plan.   48 y.o. female with the following issues.    1. Dysuria/Flank pain  Urinalysis in office today showing no signs of esterase or nitrite.  She did have trace blood.  Symptoms nonspecific could be anywhere from infection to kidney stone to back pain.  Have sent for culture as well as chemistry to make sure normal kidney function.  She will manage expectantly if going away no further treatment if worsening she will present to urgent care.  Will await results of tests.  - POCT Urinalysis  - URINE CULTURE(NEW); Future  - BASIC METABOLIC PANEL; Future

## 2018-12-14 LAB
MEV IGG SER IA-ACNC: 0.3
MUV IGG SER IA-ACNC: 0.12

## 2018-12-14 NOTE — NON-PROVIDER
Patient was seen today for a pre employment px. Pt had a tdap and flu vaccine. Hep b was declined at this time. D/S was done. No mask fit was required at this time due to AYSE.    All forms scanned and sent to HR

## 2018-12-15 LAB
GAMMA INTERFERON BACKGROUND BLD IA-ACNC: 0.04 IU/ML
M TB IFN-G BLD-IMP: NEGATIVE
M TB IFN-G CD4+ BCKGRND COR BLD-ACNC: 0.06 IU/ML
MITOGEN IGNF BCKGRD COR BLD-ACNC: >10 IU/ML
QFT TB2 - NIL TBQ2: 0.04 IU/ML
RUBV AB SER QL: 374 IU/ML

## 2018-12-16 LAB
BACTERIA UR CULT: NORMAL
SIGNIFICANT IND 70042: NORMAL
SITE SITE: NORMAL
SOURCE SOURCE: NORMAL

## 2018-12-17 LAB — VZV IGG SER IA-ACNC: 0.65

## 2018-12-20 ENCOUNTER — EH NON-PROVIDER (OUTPATIENT)
Dept: OCCUPATIONAL MEDICINE | Facility: CLINIC | Age: 48
End: 2018-12-20

## 2018-12-20 DIAGNOSIS — Z23 NEED FOR VACCINATION: ICD-10-CM

## 2018-12-20 PROCEDURE — 90707 MMR VACCINE SC: CPT | Performed by: PREVENTIVE MEDICINE

## 2019-01-15 ENCOUNTER — OFFICE VISIT (OUTPATIENT)
Dept: URGENT CARE | Facility: CLINIC | Age: 49
End: 2019-01-15
Payer: COMMERCIAL

## 2019-01-15 ENCOUNTER — HOSPITAL ENCOUNTER (OUTPATIENT)
Facility: MEDICAL CENTER | Age: 49
End: 2019-01-15
Attending: PHYSICIAN ASSISTANT
Payer: COMMERCIAL

## 2019-01-15 VITALS
RESPIRATION RATE: 16 BRPM | BODY MASS INDEX: 26.31 KG/M2 | OXYGEN SATURATION: 99 % | HEART RATE: 87 BPM | SYSTOLIC BLOOD PRESSURE: 112 MMHG | HEIGHT: 62 IN | WEIGHT: 143 LBS | DIASTOLIC BLOOD PRESSURE: 72 MMHG | TEMPERATURE: 98.6 F

## 2019-01-15 DIAGNOSIS — N30.01 ACUTE CYSTITIS WITH HEMATURIA: Primary | ICD-10-CM

## 2019-01-15 DIAGNOSIS — N30.01 ACUTE CYSTITIS WITH HEMATURIA: ICD-10-CM

## 2019-01-15 LAB
APPEARANCE UR: CLEAR
BILIRUB UR STRIP-MCNC: NEGATIVE MG/DL
COLOR UR AUTO: YELLOW
GLUCOSE UR STRIP.AUTO-MCNC: 500 MG/DL
KETONES UR STRIP.AUTO-MCNC: NEGATIVE MG/DL
LEUKOCYTE ESTERASE UR QL STRIP.AUTO: NEGATIVE
NITRITE UR QL STRIP.AUTO: NEGATIVE
PH UR STRIP.AUTO: 6 [PH] (ref 5–8)
PROT UR QL STRIP: NEGATIVE MG/DL
RBC UR QL AUTO: NEGATIVE
SP GR UR STRIP.AUTO: 1.02
UROBILINOGEN UR STRIP-MCNC: 0.2 MG/DL

## 2019-01-15 PROCEDURE — 81002 URINALYSIS NONAUTO W/O SCOPE: CPT | Performed by: PHYSICIAN ASSISTANT

## 2019-01-15 PROCEDURE — 87086 URINE CULTURE/COLONY COUNT: CPT

## 2019-01-15 PROCEDURE — 99214 OFFICE O/P EST MOD 30 MIN: CPT | Performed by: PHYSICIAN ASSISTANT

## 2019-01-15 RX ORDER — PHENAZOPYRIDINE HYDROCHLORIDE 200 MG/1
200 TABLET, FILM COATED ORAL 3 TIMES DAILY
Qty: 6 TAB | Refills: 0 | Status: SHIPPED | OUTPATIENT
Start: 2019-01-15 | End: 2019-01-15 | Stop reason: SDUPTHER

## 2019-01-15 RX ORDER — PHENAZOPYRIDINE HYDROCHLORIDE 200 MG/1
200 TABLET, FILM COATED ORAL 3 TIMES DAILY
Qty: 6 TAB | Refills: 0 | Status: SHIPPED | OUTPATIENT
Start: 2019-01-15 | End: 2019-01-17

## 2019-01-15 RX ORDER — FLUCONAZOLE 150 MG/1
TABLET ORAL
Qty: 3 TAB | Refills: 0 | Status: SHIPPED | OUTPATIENT
Start: 2019-01-15 | End: 2019-04-22

## 2019-01-15 RX ORDER — SULFAMETHOXAZOLE AND TRIMETHOPRIM 800; 160 MG/1; MG/1
1 TABLET ORAL EVERY 12 HOURS
Qty: 10 TAB | Refills: 0 | Status: SHIPPED | OUTPATIENT
Start: 2019-01-15 | End: 2019-01-20

## 2019-01-15 RX ORDER — SULFAMETHOXAZOLE AND TRIMETHOPRIM 800; 160 MG/1; MG/1
1 TABLET ORAL EVERY 12 HOURS
Qty: 10 TAB | Refills: 0 | Status: SHIPPED | OUTPATIENT
Start: 2019-01-15 | End: 2019-01-15 | Stop reason: SDUPTHER

## 2019-01-16 NOTE — PROGRESS NOTES
Subjective:      Pt is a 48 y.o. female who presents with Dysuria            HPI  This is a new problem. PT comes into the UC with a chief complaint of dysuria, burning on urination, urgency, frequency, and bladder pressure x 2 weeks. PT denies fevers or chills, CP, SOB, NVD, paresthesias, headaches, dizziness, change in vision, hives, or joint pain. PT states the pain is a 6/10 with burning upon urination, aching in nature and worse at night. Pt states they have not taken any RX meds for this issue. Pt denies flank or back pain as well. The pt's medication list, problem list, and allergies have been evaluated and reviewed during today's visit.      PMH:  Past Medical History:   Diagnosis Date   • Acne rosacea 2011   • Hemorrhoids    • History of tobacco use 2011   • Hyperlipidemia 2011   • Metabolic syndrome 2011       PSH:  Past Surgical History:   Procedure Laterality Date   • CERVICAL DISK AND FUSION ANTERIOR  3/8/2016    Procedure: CERVICAL DISK AND FUSION ANTERIOR C6-7;  Surgeon: Jonas Ramos M.D.;  Location: SURGERY [facility];  Service:    • GM BY LAPAROSCOPY  08    Performed by LUCAS HILL at SURGERY [facility] Hx:    family history includes Cancer in her maternal aunt; Heart Attack in her maternal grandfather; No Known Problems in her brother, father, mother, and sister.  Family Status   Relation Status   • MGFa         MI   • MAunt (Not Specified)   • Mo Alive   • Fa Alive   • Sis Alive   • Bro Alive       Soc HX:  Social History     Social History   • Marital status:      Spouse name: N/A   • Number of children: N/A   • Years of education: N/A     Occupational History   • Not on file.     Social History Main Topics   • Smoking status: Former Smoker     Packs/day: 1.00     Years: 10.00     Types: Cigarettes     Quit date: 2008   • Smokeless tobacco: Never Used   • Alcohol use No      Comment: sober 5 years   • Drug use: No   •  Sexual activity: Yes     Partners: Male      Comment: vasectomy     Other Topics Concern   • Not on file     Social History Narrative   • No narrative on file         Medications:    Current Outpatient Prescriptions:   •  sulfamethoxazole-trimethoprim (BACTRIM DS) 800-160 MG tablet, Take 1 Tab by mouth every 12 hours for 5 days., Disp: 10 Tab, Rfl: 0  •  phenazopyridine (PYRIDIUM) 200 MG Tab, Take 1 Tab by mouth 3 times a day for 2 days., Disp: 6 Tab, Rfl: 0      Allergies:  Macrobid [nitrofurantoin]    ROS  Review of Systems   Constitutional: Negative for fever, chills and malaise/fatigue.   HENT: Negative for congestion and sore throat.    Eyes: Negative for blurred vision, double vision and photophobia.   Respiratory: Negative for cough and shortness of breath.  Cardiovascular: Negative for chest pain and palpitations.   Gastrointestinal: Negative for nausea, vomiting, abdominal pain, diarrhea and constipation.   Genitourinary: Positive for dysuria, urgency and frequency.   Musculoskeletal: Negative for joint pain and myalgias.   Skin: Negative for rash.   Neurological: Negative for dizziness, tingling and headaches.   Endo/Heme/Allergies: Does not bruise/bleed easily.   Psychiatric/Behavioral: Negative for depression. The patient is not nervous/anxious.           Objective:     There were no vitals taken for this visit.     Physical Exam      Physical Exam   Constitutional: She is oriented to person, place, and time. She appears well-developed and well-nourished. No distress.   HENT:   Head: Normocephalic and atraumatic.   Right Ear: External ear normal.   Left Ear: External ear normal.   Nose: Nose normal.   Mouth/Throat: Oropharynx is clear and moist. No oropharyngeal exudate.   Eyes: Conjunctivae normal and EOM are normal. Pupils are equal, round, and reactive to light.   Neck: Normal range of motion. Neck supple. No thyromegaly present.   Cardiovascular: Normal rate, regular rhythm, normal heart sounds and  intact distal pulses.  Exam reveals no gallop and no friction rub.    No murmur heard.  Pulmonary/Chest: Effort normal and breath sounds normal. No respiratory distress. She has no wheezes. She has no rales. She exhibits no tenderness.   Abdominal: Soft. Bowel sounds are normal. She exhibits no distension and no mass. There is no tenderness. There is no rebound and no guarding.   Genitourinary:        Pt deferred   Musculoskeletal: Normal range of motion. She exhibits no edema and no tenderness.   Lymphadenopathy:     She has no cervical adenopathy.   Neurological: She is alert and oriented to person, place, and time. She has normal reflexes. No cranial nerve deficit.   Skin: Skin is warm and dry. No rash noted. No erythema.   Psychiatric: She has a normal mood and affect. Her behavior is normal. Judgment and thought content normal.          Assessment/Plan:     1. Acute cystitis with hematuria    - POCT Urinalysis-->wnl  - Urine Culture; Future  - sulfamethoxazole-trimethoprim (BACTRIM DS) 800-160 MG tablet; Take 1 Tab by mouth every 12 hours for 5 days.  Dispense: 10 Tab; Refill: 0  - phenazopyridine (PYRIDIUM) 200 MG Tab; Take 1 Tab by mouth 3 times a day for 2 days.  Dispense: 6 Tab; Refill: 0    Pt asking for contingency plan  Rest, fluids encouraged.  AVS with medical info given.  Pt was in full understanding and agreement with the plan.  Follow-up as needed if symptoms worsen or fail to improve.

## 2019-01-17 LAB
BACTERIA UR CULT: NORMAL
SIGNIFICANT IND 70042: NORMAL
SITE SITE: NORMAL
SOURCE SOURCE: NORMAL

## 2019-01-24 ENCOUNTER — EH NON-PROVIDER (OUTPATIENT)
Dept: OCCUPATIONAL MEDICINE | Facility: CLINIC | Age: 49
End: 2019-01-24

## 2019-01-24 ENCOUNTER — OFFICE VISIT (OUTPATIENT)
Dept: MEDICAL GROUP | Facility: MEDICAL CENTER | Age: 49
End: 2019-01-24
Payer: COMMERCIAL

## 2019-01-24 VITALS
DIASTOLIC BLOOD PRESSURE: 72 MMHG | OXYGEN SATURATION: 97 % | HEIGHT: 62 IN | SYSTOLIC BLOOD PRESSURE: 122 MMHG | RESPIRATION RATE: 14 BRPM | BODY MASS INDEX: 24.75 KG/M2 | TEMPERATURE: 98.5 F | WEIGHT: 134.48 LBS | HEART RATE: 88 BPM

## 2019-01-24 DIAGNOSIS — Z23 NEED FOR VACCINATION: ICD-10-CM

## 2019-01-24 DIAGNOSIS — A60.00 HERPES SIMPLEX INFECTION OF GENITOURINARY SYSTEM: ICD-10-CM

## 2019-01-24 DIAGNOSIS — B97.7 HPV IN FEMALE: ICD-10-CM

## 2019-01-24 DIAGNOSIS — R93.89 THICKENED ENDOMETRIUM: ICD-10-CM

## 2019-01-24 DIAGNOSIS — F43.23 SITUATIONAL MIXED ANXIETY AND DEPRESSIVE DISORDER: ICD-10-CM

## 2019-01-24 DIAGNOSIS — Z56.6 STRESS AT WORK: ICD-10-CM

## 2019-01-24 DIAGNOSIS — N94.10 DYSPAREUNIA IN FEMALE: ICD-10-CM

## 2019-01-24 DIAGNOSIS — E55.9 HYPOVITAMINOSIS D: ICD-10-CM

## 2019-01-24 PROCEDURE — 90707 MMR VACCINE SC: CPT | Performed by: PREVENTIVE MEDICINE

## 2019-01-24 PROCEDURE — 99214 OFFICE O/P EST MOD 30 MIN: CPT | Performed by: INTERNAL MEDICINE

## 2019-01-24 PROCEDURE — 90716 VAR VACCINE LIVE SUBQ: CPT | Performed by: PREVENTIVE MEDICINE

## 2019-01-24 RX ORDER — SULFAMETHOXAZOLE AND TRIMETHOPRIM 400; 80 MG/1; MG/1
TABLET ORAL
Refills: 0 | COMMUNITY
Start: 2019-01-15 | End: 2019-04-15

## 2019-01-24 RX ORDER — ERGOCALCIFEROL 1.25 MG/1
50000 CAPSULE ORAL
Qty: 12 CAP | Refills: 1 | Status: SHIPPED | OUTPATIENT
Start: 2019-01-24 | End: 2019-05-10

## 2019-01-24 SDOH — HEALTH STABILITY - MENTAL HEALTH: OTHER PHYSICAL AND MENTAL STRAIN RELATED TO WORK: Z56.6

## 2019-01-24 ASSESSMENT — PATIENT HEALTH QUESTIONNAIRE - PHQ9
SUM OF ALL RESPONSES TO PHQ9 QUESTIONS 1 AND 2: 0
5. POOR APPETITE OR OVEREATING: 0
9. THOUGHTS THAT YOU WOULD BE BETTER OFF DEAD, OR OF HURTING YOURSELF: 0
8. MOVING OR SPEAKING SO SLOWLY THAT OTHER PEOPLE COULD HAVE NOTICED. OR THE OPPOSITE, BEING SO FIGETY OR RESTLESS THAT YOU HAVE BEEN MOVING AROUND A LOT MORE THAN USUAL: 1
2. FEELING DOWN, DEPRESSED, IRRITABLE, OR HOPELESS: 0
SUM OF ALL RESPONSES TO PHQ QUESTIONS 1-9: 7
CLINICAL INTERPRETATION OF PHQ2 SCORE: 0
3. TROUBLE FALLING OR STAYING ASLEEP OR SLEEPING TOO MUCH: 2
4. FEELING TIRED OR HAVING LITTLE ENERGY: 2
6. FEELING BAD ABOUT YOURSELF - OR THAT YOU ARE A FAILURE OR HAVE LET YOURSELF OR YOUR FAMILY DOWN: 1
7. TROUBLE CONCENTRATING ON THINGS, SUCH AS READING THE NEWSPAPER OR WATCHING TELEVISION: 1
1. LITTLE INTEREST OR PLEASURE IN DOING THINGS: 0

## 2019-01-24 NOTE — PROGRESS NOTES
CHIEF COMPLAINT  Chief Complaint   Patient presents with   • Follow-Up     Stress at work, anxiety     HPI  Patient is a 48 y.o. female patient who presents today for the following     Situational anxiety, depression, stress at work  Onset: In the last few months  Trigger: Changed job, that is very stressful  Episodes of anxiety are accompanied with occasional palpitations.  Mood/anxiety currently does affect: daily activities/sleep.  Previous treatment: None  Current treatment: None     Risk factors:   · Depression, anxiety  · H/o phobia: no  · H/o panic attacks: no  · H/o hypomanic or manic episode: no  · Substance abuse  (alcohol,  prescription drugs caffeine, tobacco): no  · Family support: yes  · Living alone:  no  · Family history of psych disorders: Unknown  · Stress: no  · PMH of abuse (sexual, physical, emotional abuse; neglect): no      JAMESON-7 score:  1/19   1. Feeling nervous, anxious, or on edge            3   2. Not being able to stop or control worrying 2   3. Worrying too much about different things 2   4. Trouble relaxing 2   5. Being so restless that it is hard to sit still 1   6. Becoming easily annoyed or irritable 1   7. Feeling afraid as if something awful might happen 1   Total score 12     Depression Screen (PHQ-2/PHQ-9) 6/13/2018 1/24/2019 1/24/2019   PHQ-2 Total Score - - 0   PHQ-2 Total Score 0 0 -   PHQ-9 Total Score - - 7     Hypovitaminosis D  The patient had low vitamin D level.  No vitamin D supplement    GYN: Dyspareunia, HSV/HPV infection, thickened endometrium  The patient has history of dyspareunia history of HSV infection.   Last year, she was found to have positive HPV infection with a normal Pap smear on 7/30/18.  Vaginal ultrasound showed thickened endometrium, and she has been followed up by GYN.  Reviewed / discussed labs and imaging with patient.    Reviewed PMH, PSH, FH, SH, ALL, HCM/IMM, no changes  Reviewed MEDS, no changes    Patient Active Problem List    Diagnosis Date  Noted   • Thickened endometrium 10/04/2018   • HPV in female 10/04/2018   • Vitamin D deficiency 07/30/2018   • Encounter for monitoring statin therapy 07/30/2018   • Vaginal odor 07/30/2018   • Chronic fatigue 06/29/2018   • Obesity (BMI 30-39.9) 06/29/2018   • Symptoms referable to hip joint 03/05/2018   • Right ear pain 03/05/2018   • Herpes simplex infection of genitourinary system 10/17/2016   • Seasonal allergic rhinitis due to pollen 10/17/2016   • Herpes labialis 03/13/2014   • History of tobacco use 09/04/2011   • Hyperlipidemia 06/08/2011     CURRENT MEDICATIONS  Current Outpatient Prescriptions   Medication Sig Dispense Refill   • vitamin D, Ergocalciferol, (DRISDOL) 43277 units Cap capsule Take 1 Cap by mouth every 7 days. 12 Cap 1   • sulfamethoxazole-trimethoprim (BACTRIM) 400-80 MG Tab   0   • fluconazole (DIFLUCAN) 150 MG tablet Take 1 tab PO at onset of symptoms then one tab PO every other day until symptoms resolve. 3 Tab 0     No current facility-administered medications for this visit.      ALLERGIES  Allergies: Macrobid [nitrofurantoin]  PAST MEDICAL HISTORY  Past Medical History:   Diagnosis Date   • Metabolic syndrome 9/4/2011   • History of tobacco use 9/4/2011   • Acne rosacea 9/4/2011   • Hyperlipidemia 6/8/2011   • Hemorrhoids      SURGICAL HISTORY  She  has a past surgical history that includes orly by laparoscopy (11/27/08) and cervical disk and fusion anterior (3/8/2016).  SOCIAL HISTORY  Social History   Substance Use Topics   • Smoking status: Former Smoker     Packs/day: 1.00     Years: 10.00     Types: Cigarettes     Quit date: 1/1/2008   • Smokeless tobacco: Never Used   • Alcohol use No      Comment: sober 5 years     Social History     Social History Narrative   • No narrative on file     FAMILY HISTORY  Family History   Problem Relation Age of Onset   • Heart Attack Maternal Grandfather         d. 40s   • Cancer Maternal Aunt         Lung   • No Known Problems Mother    • No  "Known Problems Father    • No Known Problems Sister    • No Known Problems Brother      Family Status   Relation Status   • MGFa         MI   • MAunt (Not Specified)   • Mo Alive   • Fa Alive   • Sis Alive   • Bro Alive     ROS   Constitutional: Negative for fever, chills.  HENT: Negative for congestion.  Eyes: Negative for blurred vision.   Respiratory: Negative for cough, shortness of breath.  Cardiovascular: Negative for chest pain, palpitations.   Gastrointestinal: Negative for heartburn, abdominal pain.   Genitourinary: He had a recent UTI. And per HPI.  Musculoskeletal: Negative for significant myalgias, back pain and joint pain.   Skin: Negative for rash and itching.   Neuro: Negative for dizziness,  headaches.   Endo/Heme/Allergies: Does not bruise/bleed easily.   Psychiatric/Behavioral: as above.    PHYSICAL EXAM   Blood pressure 122/72, pulse 88, temperature 36.9 °C (98.5 °F), temperature source Temporal, resp. rate 14, height 1.575 m (5' 2.01\"), weight 61 kg (134 lb 7.7 oz), SpO2 97 %, not currently breastfeeding. Body mass index is 24.59 kg/m².  General:  NAD, well appearing  HEENT:   NC/AT, PERRLA, EOMI, TMs are clear. Oropharyngeal mucosa is pink,  without lesions;  no cervical / supraclavicular  lymphadenopathy, no thyromegaly.    Cardiovascular: RRR.   No m/r/g. No carotid bruits .      Lungs:   CTAB, no w/r/r, no respiratory distress.  Abdomen: Soft, NT/ND + BS; no suprapubic tenderness; no masses or hepatosplenomegaly.  Extremities:  2+ DP and radial pulses bilaterally.  No c/c/e.   Skin:  Warm, dry.  No erythema. No rash.   Neurologic: Alert & oriented x 3. CN II-XII grossly intact. Brachioradialis / knee DTR are 2/4, symmetric. Strength and sensation grossly intact.  No focal deficits.  Psychiatric:  Affect normal, mood normal, judgment normal.    LABS     Labs are reviewed and discussed with a patient  Lab Results   Component Value Date/Time    CHOLSTRLTOT 257 (H) 2018 07:52 AM    "  (H) 07/02/2018 07:52 AM    HDL 64 07/02/2018 07:52 AM    TRIGLYCERIDE 67 07/02/2018 07:52 AM       Lab Results   Component Value Date/Time    SODIUM 140 12/13/2018 03:43 PM    POTASSIUM 3.7 12/13/2018 03:43 PM    CHLORIDE 105 12/13/2018 03:43 PM    CO2 28 12/13/2018 03:43 PM    GLUCOSE 89 12/13/2018 03:43 PM    BUN 19 12/13/2018 03:43 PM    CREATININE 0.49 (L) 12/13/2018 03:43 PM    CREATININE 0.6 11/27/2008 04:55 AM     Lab Results   Component Value Date/Time    ALKPHOSPHAT 50 07/02/2018 07:52 AM    ASTSGOT 17 07/02/2018 07:52 AM    ALTSGPT 21 07/02/2018 07:52 AM    TBILIRUBIN 0.6 07/02/2018 07:52 AM      Lab Results   Component Value Date/Time    HBA1C 5.5 07/02/2018 07:52 AM    HBA1C 5.6 02/27/2013 07:59 AM      Ref. Range 7/2/2018    25-Hydroxy   Vitamin D 25  30 - 100 ng/mL 18 (L)     Lab Results   Component Value Date/Time    WBC 5.3 07/02/2018 07:52 AM    RBC 4.54 07/02/2018 07:52 AM    HEMOGLOBIN 13.5 07/02/2018 07:52 AM    HEMATOCRIT 43.0 07/02/2018 07:52 AM    MCV 94.7 07/02/2018 07:52 AM    MCH 29.7 07/02/2018 07:52 AM    MCHC 31.4 (L) 07/02/2018 07:52 AM    MPV 11.4 07/02/2018 07:52 AM    NEUTSPOLYS 41.40 (L) 07/02/2018 07:52 AM    LYMPHOCYTES 39.80 07/02/2018 07:52 AM    MONOCYTES 7.20 07/02/2018 07:52 AM    EOSINOPHILS 9.10 (H) 07/02/2018 07:52 AM    BASOPHILS 2.30 (H) 07/02/2018 07:52 AM       Ref. Range 7/30/2018    HPV Genotype 16  Negative  Negative   HPV Genotype 18  Negative  Negative   HPV Other High Risk Genotypes  Negative  POSITIVE (A)     IMAGING     None    ASSESMENT AND PLAN       1. Situational mixed anxiety and depressive disorder  2. Stress at work  Discussed about techniques to decrease stress.  F/u with PCP as needed     3. Hypovitaminosis D  Start high dose vitamin D:  - VITAMIN D,25 HYDROXY; Future  - vitamin D, Ergocalciferol, (DRISDOL) 96397 units Cap capsule; Take 1 Cap by mouth every 7 days.  Dispense: 12 Cap; Refill: 1    4. Dyspareunia in female  Discussed about  possible causes - she will continue f/u by GYN.    5. Herpes simplex infection of genitourinary system  No current lesions.    6. HPV in female  Need PAP every year    7. Thickened endometrium  Continue GYN f/u     Counseling:   - Smoking:  Nonsmoker    Followup: as needed with PCP    All questions are answered.    Please note that this dictation was created using voice recognition software, and that there might be errors of hanh and possibly content.

## 2019-02-01 ENCOUNTER — EH NON-PROVIDER (OUTPATIENT)
Dept: OCCUPATIONAL MEDICINE | Facility: CLINIC | Age: 49
End: 2019-02-01

## 2019-02-01 DIAGNOSIS — Z71.85 IMMUNIZATION COUNSELING: ICD-10-CM

## 2019-02-01 NOTE — PROGRESS NOTES
Pre-employment medical surveillance reviewed and signed. MMR vaccine series required. MMR #1 administered on 12/20/18. MMR #2 administered on 1/24/19.  Varicella vaccine series required. VZV #1 administered on 1/24/19. VZV #2 scheduled for 2/26/19.  Quantiferon result documented in immunizations. Document returned to monthly assigned MA.

## 2019-03-28 ENCOUNTER — EH NON-PROVIDER (OUTPATIENT)
Dept: OCCUPATIONAL MEDICINE | Facility: CLINIC | Age: 49
End: 2019-03-28

## 2019-03-28 DIAGNOSIS — Z23 NEED FOR VACCINATION: ICD-10-CM

## 2019-03-28 PROCEDURE — 90716 VAR VACCINE LIVE SUBQ: CPT | Performed by: FAMILY MEDICINE

## 2019-04-10 ENCOUNTER — TELEPHONE (OUTPATIENT)
Dept: MEDICAL GROUP | Facility: MEDICAL CENTER | Age: 49
End: 2019-04-10

## 2019-04-10 DIAGNOSIS — N89.8 VAGINAL ITCHING: ICD-10-CM

## 2019-04-10 RX ORDER — FLUCONAZOLE 100 MG/1
100 TABLET ORAL DAILY
Qty: 1 TAB | Refills: 0 | Status: SHIPPED | OUTPATIENT
Start: 2019-04-10 | End: 2019-04-22

## 2019-04-10 NOTE — TELEPHONE ENCOUNTER
----- Message from Samantha Baker sent at 4/10/2019  8:33 AM PDT -----  Regarding: RE: Non-Urgent Medical Question  Contact: [phone]  Yes I have tried over the counter solutions.  Temporary relief.  I have burning, itchy, dryness, gooey discharge.  I'm 48 and I get many yeast infections, not sure why but it is usually when my sex life is good.   ----- Message -----  From: CATHERINE Najera  Sent: 4/10/2019  7:34 AM PDT  To: Samantha Baker  Subject: RE: Non-Urgent Medical Question  Marco Singh,    What symptoms are you having? Have you had yeast infections in the past? Have you tried any over the counter yeast infection treatments yet?    CATHERINE Najera      ----- Message -----     From: Samantha Baker     Sent: 4/9/2019  8:04 AM PDT       To: CATHERINE Najera  Subject: Non-Urgent Medical Question    Yoan Hines when I first talked to you, we agreed for simple things I could reach out and not have to come in.  I am currently fighting a yeast infection, and wondering if a few doses of Diflucan could be prescribed.    Thanks for you help!    Samantha

## 2019-04-10 NOTE — TELEPHONE ENCOUNTER
Diflucan ordered for possible yeast infection. Patient has had many in the past. Has tried OTC yeast infection treatment without improvement. If symptoms do not improve she will need an appointment for evaluation.     DWAIN Najera.

## 2019-04-15 ENCOUNTER — OFFICE VISIT (OUTPATIENT)
Dept: URGENT CARE | Facility: CLINIC | Age: 49
End: 2019-04-15
Payer: COMMERCIAL

## 2019-04-15 ENCOUNTER — HOSPITAL ENCOUNTER (OUTPATIENT)
Dept: LAB | Facility: MEDICAL CENTER | Age: 49
End: 2019-04-15
Payer: COMMERCIAL

## 2019-04-15 ENCOUNTER — HOSPITAL ENCOUNTER (OUTPATIENT)
Facility: MEDICAL CENTER | Age: 49
End: 2019-04-15
Attending: PHYSICIAN ASSISTANT
Payer: COMMERCIAL

## 2019-04-15 VITALS
WEIGHT: 130 LBS | RESPIRATION RATE: 16 BRPM | TEMPERATURE: 100 F | DIASTOLIC BLOOD PRESSURE: 78 MMHG | HEIGHT: 62 IN | SYSTOLIC BLOOD PRESSURE: 122 MMHG | BODY MASS INDEX: 23.92 KG/M2 | OXYGEN SATURATION: 99 % | HEART RATE: 70 BPM

## 2019-04-15 DIAGNOSIS — N30.01 ACUTE CYSTITIS WITH HEMATURIA: ICD-10-CM

## 2019-04-15 DIAGNOSIS — Z86.19 HISTORY OF CANDIDAL VULVOVAGINITIS: ICD-10-CM

## 2019-04-15 LAB
APPEARANCE UR: NORMAL
BDY FAT % MEASURED: 30.5 %
BILIRUB UR STRIP-MCNC: NEGATIVE MG/DL
BP DIAS: 64 MMHG
BP SYS: 101 MMHG
CHOLEST SERPL-MCNC: 193 MG/DL (ref 100–199)
COLOR UR AUTO: NORMAL
DIABETES HTDIA: NO
EVENT NAME HTEVT: NORMAL
FASTING HTFAS: YES
GLUCOSE SERPL-MCNC: 85 MG/DL (ref 65–99)
GLUCOSE UR STRIP.AUTO-MCNC: NEGATIVE MG/DL
HDLC SERPL-MCNC: 54 MG/DL
HYPERTENSION HTHYP: NO
INT CON NEG: NORMAL
INT CON POS: NORMAL
KETONES UR STRIP.AUTO-MCNC: NEGATIVE MG/DL
LDLC SERPL CALC-MCNC: 127 MG/DL
LEUKOCYTE ESTERASE UR QL STRIP.AUTO: NORMAL
NITRITE UR QL STRIP.AUTO: POSITIVE
PH UR STRIP.AUTO: 6 [PH] (ref 5–8)
POC URINE PREGNANCY TEST: NEGATIVE
PROT UR QL STRIP: NORMAL MG/DL
RBC UR QL AUTO: NORMAL
SCREENING LOC CITY HTCIT: NORMAL
SCREENING LOC STATE HTSTA: NORMAL
SCREENING LOCATION HTLOC: NORMAL
SMOKING HTSMO: NO
SP GR UR STRIP.AUTO: 1.03
SUBSCRIBER ID HTSID: NORMAL
TRIGL SERPL-MCNC: 60 MG/DL (ref 0–149)
UROBILINOGEN UR STRIP-MCNC: 0.2 MG/DL

## 2019-04-15 PROCEDURE — 80061 LIPID PANEL: CPT

## 2019-04-15 PROCEDURE — 81002 URINALYSIS NONAUTO W/O SCOPE: CPT | Performed by: PHYSICIAN ASSISTANT

## 2019-04-15 PROCEDURE — 87077 CULTURE AEROBIC IDENTIFY: CPT

## 2019-04-15 PROCEDURE — 87086 URINE CULTURE/COLONY COUNT: CPT

## 2019-04-15 PROCEDURE — 81025 URINE PREGNANCY TEST: CPT | Performed by: PHYSICIAN ASSISTANT

## 2019-04-15 PROCEDURE — 82947 ASSAY GLUCOSE BLOOD QUANT: CPT

## 2019-04-15 PROCEDURE — S5190 WELLNESS ASSESSMENT BY NONPH: HCPCS

## 2019-04-15 PROCEDURE — 36415 COLL VENOUS BLD VENIPUNCTURE: CPT

## 2019-04-15 PROCEDURE — 87186 SC STD MICRODIL/AGAR DIL: CPT

## 2019-04-15 PROCEDURE — 99214 OFFICE O/P EST MOD 30 MIN: CPT | Performed by: PHYSICIAN ASSISTANT

## 2019-04-15 RX ORDER — FLUCONAZOLE 150 MG/1
150 TABLET ORAL ONCE
Qty: 2 TAB | Refills: 0 | Status: SHIPPED | OUTPATIENT
Start: 2019-04-15 | End: 2019-04-15

## 2019-04-15 RX ORDER — SULFAMETHOXAZOLE AND TRIMETHOPRIM 800; 160 MG/1; MG/1
1 TABLET ORAL EVERY 12 HOURS
Qty: 10 TAB | Refills: 0 | Status: SHIPPED | OUTPATIENT
Start: 2019-04-15 | End: 2019-04-20

## 2019-04-15 ASSESSMENT — ENCOUNTER SYMPTOMS
DIARRHEA: 0
FLANK PAIN: 0
CHILLS: 0
NAUSEA: 0
MUSCULOSKELETAL NEGATIVE: 1
VOMITING: 0
ABDOMINAL PAIN: 1
CONSTIPATION: 0
DIZZINESS: 0
FEVER: 1
SHORTNESS OF BREATH: 0

## 2019-04-15 NOTE — PROGRESS NOTES
"Subjective:      Samantha Baker is a 48 y.o. female who presents with Abdominal Pain (Cramping,lower abdominal,lower back pain- started this morning)            Abdominal Pain   This is a new problem. The current episode started today. The onset quality is gradual. The problem occurs constantly. The problem has been waxing and waning. The pain is located in the suprapubic region. The pain is at a severity of 2/10. The pain is mild. The quality of the pain is cramping. The abdominal pain does not radiate. Associated symptoms include dysuria, a fever and frequency. Pertinent negatives include no constipation, diarrhea, hematuria, nausea or vomiting. Nothing aggravates the pain. The pain is relieved by nothing. She has tried nothing for the symptoms. Her past medical history is significant for abdominal surgery and pancreatitis. There is no history of irritable bowel syndrome.     Patient reports she's been getting UTIs more frequently than normal, usually triggered by intercourse. She has a history of kidney stones >20 years ago.     Review of Systems   Constitutional: Positive for fever. Negative for chills.   HENT: Negative for congestion.    Respiratory: Negative for shortness of breath.    Cardiovascular: Negative for chest pain.   Gastrointestinal: Positive for abdominal pain. Negative for constipation, diarrhea, nausea and vomiting.   Genitourinary: Positive for dysuria, frequency and urgency. Negative for flank pain and hematuria.   Musculoskeletal: Negative.    Skin: Negative for rash.   Neurological: Negative for dizziness.        Objective:     /78 (BP Location: Left arm, Patient Position: Sitting, BP Cuff Size: Adult)   Pulse 70   Temp 37.8 °C (100 °F) (Temporal)   Resp 16   Ht 1.575 m (5' 2\")   Wt 59 kg (130 lb)   SpO2 99%   BMI 23.78 kg/m²      Physical Exam   Constitutional: She is oriented to person, place, and time. She appears well-developed and well-nourished. No distress.   HENT: "   Head: Normocephalic and atraumatic.   Eyes: Pupils are equal, round, and reactive to light.   Neck: Normal range of motion.   Cardiovascular: Normal rate.    Pulmonary/Chest: Effort normal.   Abdominal: Soft. Normal appearance and bowel sounds are normal. She exhibits no distension. There is tenderness in the suprapubic area. There is no rebound, no guarding, no CVA tenderness and negative Lawton's sign.       No CVAT bilaterally   Musculoskeletal: Normal range of motion.   Neurological: She is alert and oriented to person, place, and time.   Skin: Skin is warm and dry. She is not diaphoretic.   Psychiatric: She has a normal mood and affect. Her behavior is normal.   Nursing note and vitals reviewed.         PMH:  has a past medical history of Acne rosacea (9/4/2011); Hemorrhoids; History of tobacco use (9/4/2011); Hyperlipidemia (6/8/2011); and Metabolic syndrome (9/4/2011). She also has no past medical history of Addisons disease ([facility]); Adrenal disorder ([facility]); Allergy; Anemia; Anxiety; Arrhythmia; Arthritis; Asthma; Blood transfusion without reported diagnosis; Cancer ([facility]); Cataract; CHF (congestive heart failure) ([facility]); Clotting disorder ([facility]); COPD (chronic obstructive pulmonary disease) ([facility]); Cushings syndrome ([facility]); Depression; Diabetes ([facility]); Diabetic neuropathy ([facility]); GERD (gastroesophageal reflux disease); Glaucoma; Goiter; Head ache; Heart attack ([facility]); Heart murmur; HIV (human immunodeficiency virus infection) ([facility]); Hypertension; IBD (inflammatory bowel disease); Kidney disease; Meningitis; Migraine; Muscle disorder; Osteoporosis; Parathyroid disorder ([facility]); Pituitary disease ([facility]); Pulmonary emphysema ([facility]); Seizure ([facility]); Sickle cell disease ([facility]); Stroke ([facility]); Substance abuse ([facility]); Thyroid disease; Tuberculosis; or Urinary tract infection.  MEDS:   Current Outpatient Prescriptions:   •  sulfamethoxazole-trimethoprim (BACTRIM DS) 800-160 MG tablet, Take 1 Tab by mouth every 12 hours for 5 days.,  Disp: 10 Tab, Rfl: 0  •  fluconazole (DIFLUCAN) 150 MG tablet, Take 1 Tab by mouth Once for 1 dose., Disp: 2 Tab, Rfl: 0  •  fluconazole (DIFLUCAN) 100 MG Tab, Take 1 Tab by mouth every day. (Patient not taking: Reported on 4/15/2019), Disp: 1 Tab, Rfl: 0  •  vitamin D, Ergocalciferol, (DRISDOL) 69620 units Cap capsule, Take 1 Cap by mouth every 7 days. (Patient not taking: Reported on 4/15/2019), Disp: 12 Cap, Rfl: 1  •  fluconazole (DIFLUCAN) 150 MG tablet, Take 1 tab PO at onset of symptoms then one tab PO every other day until symptoms resolve. (Patient not taking: Reported on 4/15/2019), Disp: 3 Tab, Rfl: 0  ALLERGIES:   Allergies   Allergen Reactions   • Macrobid [Nitrofurantoin] Itching     SURGHX:   Past Surgical History:   Procedure Laterality Date   • CERVICAL DISK AND FUSION ANTERIOR  3/8/2016    Procedure: CERVICAL DISK AND FUSION ANTERIOR C6-7;  Surgeon: Jonas Ramos M.D.;  Location: SURGERY [facility];  Service:    • GM BY LAPAROSCOPY  11/27/08    Performed by LUCAS HILL at SURGERY [facility]     SOCHX:  reports that she quit smoking about 11 years ago. Her smoking use included Cigarettes. She has a 10.00 pack-year smoking history. She has never used smokeless tobacco. She reports that she does not drink alcohol or use drugs.  FH: family history includes Cancer in her maternal aunt; Heart Attack in her maternal grandfather; No Known Problems in her brother, father, mother, and sister.    POCT Urinalysis:  Component Results     Component Value Ref Range & Units Status   POC Color ORANGE  Negative Final   POC Appearance CLOUDY  Negative Final   POC Leukocyte Esterase TRACE  Negative Final   POC Nitrites POSITIVE  Negative Final   POC Urobiligen 0.2  Negative (0.2) mg/dL Final   POC Protein TRACE  Negative mg/dL Final   POC Urine PH 6.0  5.0 - 8.0 Final   POC Blood SMALL  Negative Final   POC Specific Gravity 1.030  <1.005 - >1.030 Final   POC Ketones NEGATIVE  Negative mg/dL Final    POC Bilirubin NEGATIVE  Negative mg/dL Final   POC Glucose NEGATIVE  Negative mg/dL Final   Last Resulted Time   Mon Apr 15, 2019 11:54 AM        Assessment/Plan:     1. Acute cystitis with hematuria  - POCT Urinalysis --> + leuks, + nitrites, + blood  - POCT Pregnancy --> negative  - URINE CULTURE(NEW); Future  - sulfamethoxazole-trimethoprim (BACTRIM DS) 800-160 MG tablet; Take 1 Tab by mouth every 12 hours for 5 days.  Dispense: 10 Tab; Refill: 0   - Complete full course of antibiotics as prescribed     - Pt educated on preventative measures for avoiding future UTIs  - Advised to increase fluid intake  - OTC Pyridium (Azo) for symptomatic relief, advised that it will turn urine orange in color  - Pending urine culture  - ER precautions given regarding pyelonephritis including fevers greater than 101 and, vomiting and dehydration, increased back pain.      2. History of candidal vulvovaginitis  - fluconazole (DIFLUCAN) 150 MG tablet; Take 1 Tab by mouth Once for 1 dose.  Dispense: 2 Tab; Refill: 0

## 2019-04-16 DIAGNOSIS — N30.01 ACUTE CYSTITIS WITH HEMATURIA: ICD-10-CM

## 2019-04-16 LAB
AMBIGUOUS DTTM AMBI4: NORMAL
SIGNIFICANT IND 70042: NORMAL
SITE SITE: NORMAL
SOURCE SOURCE: NORMAL

## 2019-04-18 ENCOUNTER — TELEPHONE (OUTPATIENT)
Dept: MEDICAL GROUP | Facility: MEDICAL CENTER | Age: 49
End: 2019-04-18

## 2019-04-18 NOTE — TELEPHONE ENCOUNTER
----- Message from CATHERINE Najera sent at 4/18/2019  7:29 AM PDT -----  Please notify patient that her LDL, bad cholesterol is elevated at 127.  This has significantly improved from 9 months ago when it was 180.  She should continue reducing saturated fats in her diet.  CATHERINE Najera

## 2019-04-19 ENCOUNTER — TELEPHONE (OUTPATIENT)
Dept: URGENT CARE | Facility: PHYSICIAN GROUP | Age: 49
End: 2019-04-19

## 2019-04-19 NOTE — TELEPHONE ENCOUNTER
Spoke to patient regarding urine culture results positive for E. Coli., susceptible to current course of Bactrim. She states she is feeling better and appreciates the phone call.

## 2019-04-22 ENCOUNTER — TELEPHONE (OUTPATIENT)
Dept: MEDICAL GROUP | Facility: MEDICAL CENTER | Age: 49
End: 2019-04-22

## 2019-04-22 DIAGNOSIS — B37.31 VAGINAL CANDIDIASIS: ICD-10-CM

## 2019-04-22 RX ORDER — FLUCONAZOLE 100 MG/1
100 TABLET ORAL
Qty: 2 TAB | Refills: 0 | Status: SHIPPED | OUTPATIENT
Start: 2019-04-22 | End: 2019-04-26

## 2019-04-22 NOTE — TELEPHONE ENCOUNTER
Please schedule her for a same-day or next day appointment with either myself or another provider to follow-up on UTI and symptoms.    DWAIN Najera.

## 2019-04-22 NOTE — TELEPHONE ENCOUNTER
Disregard last message, I will send over a prescription for fluconazole for a possible yeast infection after antibiotics.  If her symptoms persist, she should schedule an appointment.    DWAIN Najera.

## 2019-04-22 NOTE — TELEPHONE ENCOUNTER
1. Caller Name: Pt                      Call Back Number: [phone] (home)     2. Message: Pt states she was seen in UC and placed on ABX for UTI. She completed the course and now has other symptoms. She is experiencing Smelly discharge and painful burning urination. She is wonder what she she do    3. Patient approves office to leave a detailed voicemail/MyChart message: yes

## 2019-05-10 ENCOUNTER — OFFICE VISIT (OUTPATIENT)
Dept: MEDICAL GROUP | Facility: MEDICAL CENTER | Age: 49
End: 2019-05-10
Payer: COMMERCIAL

## 2019-05-10 VITALS
WEIGHT: 127 LBS | RESPIRATION RATE: 16 BRPM | HEART RATE: 80 BPM | OXYGEN SATURATION: 98 % | HEIGHT: 62 IN | TEMPERATURE: 99 F | BODY MASS INDEX: 23.37 KG/M2 | SYSTOLIC BLOOD PRESSURE: 110 MMHG | DIASTOLIC BLOOD PRESSURE: 72 MMHG

## 2019-05-10 DIAGNOSIS — N39.0 RECURRENT UTI: ICD-10-CM

## 2019-05-10 DIAGNOSIS — Z98.84 S/P LAPAROSCOPIC SLEEVE GASTRECTOMY: ICD-10-CM

## 2019-05-10 PROCEDURE — 99214 OFFICE O/P EST MOD 30 MIN: CPT | Performed by: NURSE PRACTITIONER

## 2019-05-10 RX ORDER — CEPHALEXIN 250 MG/1
125 TABLET ORAL
Qty: 30 TAB | Refills: 3 | Status: SHIPPED | OUTPATIENT
Start: 2019-05-10 | End: 2019-09-19

## 2019-05-10 NOTE — ASSESSMENT & PLAN NOTE
Done October 24th 2018 in [place]. Has lost around 40 pounds. Doing well. Taking MVI from Dr. Peters's office specifically for sleeve patients.

## 2019-05-11 NOTE — PROGRESS NOTES
Subjective:   Samantha Baker is a 48 y.o. female here today for the following concerns:    S/P laparoscopic sleeve gastrectomy  Done October 24th 2018 in [place]. Has lost around 40 pounds. Doing well. Taking MVI from Dr. Peters's office specifically for sleeve patients.     Recurrent UTI  Patient has been struggling with recurrent UTIs for the past few years. They occur frequently after intercourse. She and her  have tried multiple things including showering before and after, urinating before and after, increased water intake, applying . She wipes front to back. She is requesting a prophylactic antibiotic to take for this as it is negatively affecting her sex life. She is allergic to macrobid.        Current medicines (including changes today)  Current Outpatient Prescriptions   Medication Sig Dispense Refill   • Cephalexin 250 MG Tab Take 0.5 Tabs by mouth 1 time daily as needed. 30 Tab 3     No current facility-administered medications for this visit.      She  has a past medical history of Acne rosacea (9/4/2011); Hemorrhoids; History of tobacco use (9/4/2011); Hyperlipidemia (6/8/2011); and Metabolic syndrome (9/4/2011). She also has no past medical history of Addisons disease ([facility]); Adrenal disorder ([facility]); Allergy; Anemia; Anxiety; Arrhythmia; Arthritis; Asthma; Blood transfusion without reported diagnosis; Cancer ([facility]); Cataract; CHF (congestive heart failure) ([facility]); Clotting disorder ([facility]); COPD (chronic obstructive pulmonary disease) ([facility]); Cushings syndrome ([facility]); Depression; Diabetes ([facility]); Diabetic neuropathy ([facility]); GERD (gastroesophageal reflux disease); Glaucoma; Goiter; Head ache; Heart attack ([facility]); Heart murmur; HIV (human immunodeficiency virus infection) ([facility]); Hypertension; IBD (inflammatory bowel disease); Kidney disease; Meningitis; Migraine; Muscle disorder; Osteoporosis; Parathyroid disorder ([facility]); Pituitary disease ([facility]); Pulmonary emphysema ([facility]); Seizure ([facility]); Sickle  "cell disease ([facility]); Stroke ([facility]); Substance abuse ([facility]); Thyroid disease; Tuberculosis; or Urinary tract infection.    ROS   No chest pain, no shortness of breath, no abdominal pain  Positive ROS as per HPI.  All other systems reviewed and are negative.     Objective:     /72 (BP Location: Right arm, Patient Position: Sitting, BP Cuff Size: Adult)   Pulse 80   Temp 37.2 °C (99 °F) (Temporal)   Resp 16   Ht 1.575 m (5' 2.01\")   Wt 57.6 kg (127 lb)   SpO2 98%  Body mass index is 23.22 kg/m².     Physical Exam:  Constitutional: Alert, no distress.  Skin: Warm, dry, good turgor, no rashes in visible areas.  Eye: Equal, round and reactive, conjunctiva clear, lids normal.  ENMT: Lips without lesions, good dentition, oropharynx clear.  Neck: Trachea midline, no masses, no thyromegaly. No cervical or supraclavicular lymphadenopathy  Respiratory: Unlabored respiratory effort, lungs clear to auscultation, no wheezes, no ronchi.  Cardiovascular: Normal S1, S2, no murmur, no edema.  Abdomen: Soft, non-tender, no masses, no hepatosplenomegaly.  Psych: Alert and oriented x3, normal affect and mood.      Assessment and Plan:   The following treatment plan was discussed    1. S/P laparoscopic sleeve gastrectomy  Stable  Continue vitamins  Check levels  - VITAMIN D,25 HYDROXY; Future  - FERRITIN; Future  - IRON/TOTAL IRON BIND; Future  - VITAMIN B12; Future  - FOLATE; Future    2. Recurrent UTI  Unstable  Continue hygenic practices  Take cephalexin 125 mg once after intercourse  If UTIs continue will increase to 250 mg after intercourse.  If UTIs still continue, will refer to urology.   - Cephalexin 250 MG Tab; Take 0.5 Tabs by mouth 1 time daily as needed.  Dispense: 30 Tab; Refill: 3      Followup: Return in about 6 months (around 11/10/2019) for Review Labs.    I have placed the below orders and discussed them with an approved delegating provider. The MA is performing the below orders under the direction of " Slots

## 2019-05-11 NOTE — ASSESSMENT & PLAN NOTE
Patient has been struggling with recurrent UTIs for the past few years. They occur frequently after intercourse. She and her  have tried multiple things including showering before and after, urinating before and after, increased water intake, applying . She wipes front to back. She is requesting a prophylactic antibiotic to take for this as it is negatively affecting her sex life. She is allergic to macrobid.

## 2019-05-30 ENCOUNTER — APPOINTMENT (OUTPATIENT)
Dept: RADIOLOGY | Facility: MEDICAL CENTER | Age: 49
End: 2019-05-30
Attending: NURSE PRACTITIONER
Payer: COMMERCIAL

## 2019-05-30 DIAGNOSIS — M54.16 LUMBAR RADICULOPATHY: ICD-10-CM

## 2019-05-30 PROCEDURE — 72141 MRI NECK SPINE W/O DYE: CPT

## 2019-05-30 PROCEDURE — 72148 MRI LUMBAR SPINE W/O DYE: CPT

## 2019-09-11 ENCOUNTER — IMMUNIZATION (OUTPATIENT)
Dept: OCCUPATIONAL MEDICINE | Facility: CLINIC | Age: 49
End: 2019-09-11

## 2019-09-11 DIAGNOSIS — Z23 NEED FOR VACCINATION: ICD-10-CM

## 2019-09-11 PROCEDURE — 90686 IIV4 VACC NO PRSV 0.5 ML IM: CPT | Performed by: PREVENTIVE MEDICINE

## 2019-09-19 ENCOUNTER — APPOINTMENT (OUTPATIENT)
Dept: ADMISSIONS | Facility: MEDICAL CENTER | Age: 49
End: 2019-09-19
Attending: SPECIALIST
Payer: COMMERCIAL

## 2019-09-19 DIAGNOSIS — Z01.812 PRE-OPERATIVE LABORATORY EXAMINATION: ICD-10-CM

## 2019-09-19 LAB
ERYTHROCYTE [DISTWIDTH] IN BLOOD BY AUTOMATED COUNT: 45.4 FL (ref 35.9–50)
HCT VFR BLD AUTO: 42.2 % (ref 37–47)
HGB BLD-MCNC: 13.4 G/DL (ref 12–16)
MCH RBC QN AUTO: 30.5 PG (ref 27–33)
MCHC RBC AUTO-ENTMCNC: 31.8 G/DL (ref 33.6–35)
MCV RBC AUTO: 96.1 FL (ref 81.4–97.8)
PLATELET # BLD AUTO: 210 K/UL (ref 164–446)
PMV BLD AUTO: 11.7 FL (ref 9–12.9)
RBC # BLD AUTO: 4.39 M/UL (ref 4.2–5.4)
WBC # BLD AUTO: 4.5 K/UL (ref 4.8–10.8)

## 2019-09-19 PROCEDURE — 36415 COLL VENOUS BLD VENIPUNCTURE: CPT

## 2019-09-19 PROCEDURE — 85027 COMPLETE CBC AUTOMATED: CPT

## 2019-09-19 RX ORDER — PERPHENAZINE/AMITRIPTYLINE HCL 2 MG-25 MG
TABLET ORAL
COMMUNITY
End: 2021-12-21

## 2019-09-30 ENCOUNTER — HOSPITAL ENCOUNTER (OUTPATIENT)
Facility: MEDICAL CENTER | Age: 49
End: 2019-09-30
Attending: SPECIALIST | Admitting: SPECIALIST
Payer: COMMERCIAL

## 2019-09-30 ENCOUNTER — ANESTHESIA (OUTPATIENT)
Dept: SURGERY | Facility: MEDICAL CENTER | Age: 49
End: 2019-09-30
Payer: COMMERCIAL

## 2019-09-30 ENCOUNTER — ANESTHESIA EVENT (OUTPATIENT)
Dept: SURGERY | Facility: MEDICAL CENTER | Age: 49
End: 2019-09-30
Payer: COMMERCIAL

## 2019-09-30 VITALS
HEIGHT: 62 IN | WEIGHT: 124.12 LBS | HEART RATE: 51 BPM | SYSTOLIC BLOOD PRESSURE: 111 MMHG | RESPIRATION RATE: 16 BRPM | OXYGEN SATURATION: 92 % | TEMPERATURE: 99 F | BODY MASS INDEX: 22.84 KG/M2 | DIASTOLIC BLOOD PRESSURE: 58 MMHG

## 2019-09-30 LAB — HCG UR QL: NEGATIVE

## 2019-09-30 PROCEDURE — 700111 HCHG RX REV CODE 636 W/ 250 OVERRIDE (IP): Performed by: ANESTHESIOLOGY

## 2019-09-30 PROCEDURE — 160002 HCHG RECOVERY MINUTES (STAT): Performed by: SPECIALIST

## 2019-09-30 PROCEDURE — 160035 HCHG PACU - 1ST 60 MINS PHASE I: Performed by: SPECIALIST

## 2019-09-30 PROCEDURE — 501838 HCHG SUTURE GENERAL: Performed by: SPECIALIST

## 2019-09-30 PROCEDURE — A6222 GAUZE <=16 IN NO W/SAL W/O B: HCPCS | Performed by: SPECIALIST

## 2019-09-30 PROCEDURE — 700105 HCHG RX REV CODE 258: Performed by: SPECIALIST

## 2019-09-30 PROCEDURE — 81025 URINE PREGNANCY TEST: CPT

## 2019-09-30 PROCEDURE — 700101 HCHG RX REV CODE 250: Performed by: SPECIALIST

## 2019-09-30 PROCEDURE — 160025 RECOVERY II MINUTES (STATS): Performed by: SPECIALIST

## 2019-09-30 PROCEDURE — A9270 NON-COVERED ITEM OR SERVICE: HCPCS | Performed by: ANESTHESIOLOGY

## 2019-09-30 PROCEDURE — 700102 HCHG RX REV CODE 250 W/ 637 OVERRIDE(OP): Performed by: SPECIALIST

## 2019-09-30 PROCEDURE — 160048 HCHG OR STATISTICAL LEVEL 1-5: Performed by: SPECIALIST

## 2019-09-30 PROCEDURE — 160046 HCHG PACU - 1ST 60 MINS PHASE II: Performed by: SPECIALIST

## 2019-09-30 PROCEDURE — 160028 HCHG SURGERY MINUTES - 1ST 30 MINS LEVEL 3: Performed by: SPECIALIST

## 2019-09-30 PROCEDURE — A9270 NON-COVERED ITEM OR SERVICE: HCPCS | Performed by: SPECIALIST

## 2019-09-30 PROCEDURE — A9270 NON-COVERED ITEM OR SERVICE: HCPCS

## 2019-09-30 PROCEDURE — A6403 STERILE GAUZE>16 <= 48 SQ IN: HCPCS | Performed by: SPECIALIST

## 2019-09-30 PROCEDURE — 700102 HCHG RX REV CODE 250 W/ 637 OVERRIDE(OP)

## 2019-09-30 PROCEDURE — 500423 HCHG DRESSING, ABD COMBINE: Performed by: SPECIALIST

## 2019-09-30 PROCEDURE — 160009 HCHG ANES TIME/MIN: Performed by: SPECIALIST

## 2019-09-30 PROCEDURE — 700102 HCHG RX REV CODE 250 W/ 637 OVERRIDE(OP): Performed by: ANESTHESIOLOGY

## 2019-09-30 PROCEDURE — 160039 HCHG SURGERY MINUTES - EA ADDL 1 MIN LEVEL 3: Performed by: SPECIALIST

## 2019-09-30 PROCEDURE — 501445 HCHG STAPLER, SKIN DISP: Performed by: SPECIALIST

## 2019-09-30 PROCEDURE — 502575 HCHG PACK, BREAST: Performed by: SPECIALIST

## 2019-09-30 PROCEDURE — 160036 HCHG PACU - EA ADDL 30 MINS PHASE I: Performed by: SPECIALIST

## 2019-09-30 PROCEDURE — 700111 HCHG RX REV CODE 636 W/ 250 OVERRIDE (IP): Performed by: SPECIALIST

## 2019-09-30 RX ORDER — MIDAZOLAM HYDROCHLORIDE 1 MG/ML
1 INJECTION INTRAMUSCULAR; INTRAVENOUS
Status: DISCONTINUED | OUTPATIENT
Start: 2019-09-30 | End: 2019-09-30 | Stop reason: HOSPADM

## 2019-09-30 RX ORDER — MIDAZOLAM HYDROCHLORIDE 1 MG/ML
INJECTION INTRAMUSCULAR; INTRAVENOUS PRN
Status: DISCONTINUED | OUTPATIENT
Start: 2019-09-30 | End: 2019-09-30 | Stop reason: SURG

## 2019-09-30 RX ORDER — MEPERIDINE HYDROCHLORIDE 25 MG/ML
6.25 INJECTION INTRAMUSCULAR; INTRAVENOUS; SUBCUTANEOUS
Status: DISCONTINUED | OUTPATIENT
Start: 2019-09-30 | End: 2019-09-30 | Stop reason: HOSPADM

## 2019-09-30 RX ORDER — ACETAMINOPHEN 500 MG
1000 TABLET ORAL ONCE
Status: COMPLETED | OUTPATIENT
Start: 2019-09-30 | End: 2019-09-30

## 2019-09-30 RX ORDER — HALOPERIDOL 5 MG/ML
1 INJECTION INTRAMUSCULAR
Status: DISCONTINUED | OUTPATIENT
Start: 2019-09-30 | End: 2019-09-30 | Stop reason: HOSPADM

## 2019-09-30 RX ORDER — ONDANSETRON 2 MG/ML
INJECTION INTRAMUSCULAR; INTRAVENOUS PRN
Status: DISCONTINUED | OUTPATIENT
Start: 2019-09-30 | End: 2019-09-30 | Stop reason: SURG

## 2019-09-30 RX ORDER — DEXAMETHASONE SODIUM PHOSPHATE 10 MG/ML
INJECTION, SOLUTION INTRAMUSCULAR; INTRAVENOUS PRN
Status: DISCONTINUED | OUTPATIENT
Start: 2019-09-30 | End: 2019-09-30 | Stop reason: SURG

## 2019-09-30 RX ORDER — GABAPENTIN 300 MG/1
CAPSULE ORAL
Status: COMPLETED
Start: 2019-09-30 | End: 2019-09-30

## 2019-09-30 RX ORDER — CELECOXIB 200 MG/1
CAPSULE ORAL
Status: COMPLETED
Start: 2019-09-30 | End: 2019-09-30

## 2019-09-30 RX ORDER — DIPHENHYDRAMINE HYDROCHLORIDE 50 MG/ML
12.5 INJECTION INTRAMUSCULAR; INTRAVENOUS
Status: DISCONTINUED | OUTPATIENT
Start: 2019-09-30 | End: 2019-09-30 | Stop reason: HOSPADM

## 2019-09-30 RX ORDER — CELECOXIB 200 MG/1
200 CAPSULE ORAL ONCE
Status: COMPLETED | OUTPATIENT
Start: 2019-09-30 | End: 2019-09-30

## 2019-09-30 RX ORDER — OXYCODONE HCL 5 MG/5 ML
5 SOLUTION, ORAL ORAL
Status: COMPLETED | OUTPATIENT
Start: 2019-09-30 | End: 2019-09-30

## 2019-09-30 RX ORDER — HYDROMORPHONE HYDROCHLORIDE 1 MG/ML
0.2 INJECTION, SOLUTION INTRAMUSCULAR; INTRAVENOUS; SUBCUTANEOUS
Status: DISCONTINUED | OUTPATIENT
Start: 2019-09-30 | End: 2019-09-30 | Stop reason: HOSPADM

## 2019-09-30 RX ORDER — CEFAZOLIN SODIUM 1 G/3ML
INJECTION, POWDER, FOR SOLUTION INTRAMUSCULAR; INTRAVENOUS
Status: DISCONTINUED | OUTPATIENT
Start: 2019-09-30 | End: 2019-09-30 | Stop reason: HOSPADM

## 2019-09-30 RX ORDER — BACITRACIN 50000 [IU]/1
INJECTION, POWDER, FOR SOLUTION INTRAMUSCULAR
Status: DISCONTINUED | OUTPATIENT
Start: 2019-09-30 | End: 2019-09-30 | Stop reason: HOSPADM

## 2019-09-30 RX ORDER — SODIUM CHLORIDE, SODIUM LACTATE, POTASSIUM CHLORIDE, CALCIUM CHLORIDE 600; 310; 30; 20 MG/100ML; MG/100ML; MG/100ML; MG/100ML
INJECTION, SOLUTION INTRAVENOUS CONTINUOUS
Status: DISCONTINUED | OUTPATIENT
Start: 2019-09-30 | End: 2019-09-30 | Stop reason: HOSPADM

## 2019-09-30 RX ORDER — ONDANSETRON 2 MG/ML
4 INJECTION INTRAMUSCULAR; INTRAVENOUS
Status: DISCONTINUED | OUTPATIENT
Start: 2019-09-30 | End: 2019-09-30 | Stop reason: HOSPADM

## 2019-09-30 RX ORDER — CEFAZOLIN SODIUM 1 G/3ML
INJECTION, POWDER, FOR SOLUTION INTRAMUSCULAR; INTRAVENOUS PRN
Status: DISCONTINUED | OUTPATIENT
Start: 2019-09-30 | End: 2019-09-30 | Stop reason: SURG

## 2019-09-30 RX ORDER — HYDROMORPHONE HYDROCHLORIDE 1 MG/ML
0.4 INJECTION, SOLUTION INTRAMUSCULAR; INTRAVENOUS; SUBCUTANEOUS
Status: DISCONTINUED | OUTPATIENT
Start: 2019-09-30 | End: 2019-09-30 | Stop reason: HOSPADM

## 2019-09-30 RX ORDER — OXYCODONE HCL 5 MG/5 ML
10 SOLUTION, ORAL ORAL
Status: COMPLETED | OUTPATIENT
Start: 2019-09-30 | End: 2019-09-30

## 2019-09-30 RX ORDER — SCOLOPAMINE TRANSDERMAL SYSTEM 1 MG/1
1 PATCH, EXTENDED RELEASE TRANSDERMAL
Status: DISCONTINUED | OUTPATIENT
Start: 2019-09-30 | End: 2019-09-30 | Stop reason: HOSPADM

## 2019-09-30 RX ORDER — HYDROMORPHONE HYDROCHLORIDE 1 MG/ML
0.1 INJECTION, SOLUTION INTRAMUSCULAR; INTRAVENOUS; SUBCUTANEOUS
Status: DISCONTINUED | OUTPATIENT
Start: 2019-09-30 | End: 2019-09-30 | Stop reason: HOSPADM

## 2019-09-30 RX ORDER — ACETAMINOPHEN 500 MG
TABLET ORAL
Status: COMPLETED
Start: 2019-09-30 | End: 2019-09-30

## 2019-09-30 RX ORDER — GABAPENTIN 300 MG/1
300 CAPSULE ORAL ONCE
Status: COMPLETED | OUTPATIENT
Start: 2019-09-30 | End: 2019-09-30

## 2019-09-30 RX ORDER — LIDOCAINE HYDROCHLORIDE AND EPINEPHRINE 5; 5 MG/ML; UG/ML
INJECTION, SOLUTION INFILTRATION; PERINEURAL
Status: DISCONTINUED | OUTPATIENT
Start: 2019-09-30 | End: 2019-09-30 | Stop reason: HOSPADM

## 2019-09-30 RX ORDER — GENTAMICIN SULFATE 40 MG/ML
INJECTION, SOLUTION INTRAMUSCULAR; INTRAVENOUS
Status: DISCONTINUED | OUTPATIENT
Start: 2019-09-30 | End: 2019-09-30 | Stop reason: HOSPADM

## 2019-09-30 RX ADMIN — GABAPENTIN 300 MG: 300 CAPSULE ORAL at 07:37

## 2019-09-30 RX ADMIN — FENTANYL CITRATE 100 MCG: 50 INJECTION, SOLUTION INTRAMUSCULAR; INTRAVENOUS at 07:49

## 2019-09-30 RX ADMIN — LIDOCAINE HYDROCHLORIDE 0.5 ML: 10 INJECTION, SOLUTION INFILTRATION; PERINEURAL at 06:51

## 2019-09-30 RX ADMIN — FENTANYL CITRATE 50 MCG: 0.05 INJECTION, SOLUTION INTRAMUSCULAR; INTRAVENOUS at 12:29

## 2019-09-30 RX ADMIN — MIDAZOLAM HYDROCHLORIDE 2 MG: 1 INJECTION, SOLUTION INTRAMUSCULAR; INTRAVENOUS at 07:49

## 2019-09-30 RX ADMIN — CELECOXIB 200 MG: 200 CAPSULE ORAL at 07:36

## 2019-09-30 RX ADMIN — Medication 1000 MG: at 07:36

## 2019-09-30 RX ADMIN — SODIUM CHLORIDE, POTASSIUM CHLORIDE, SODIUM LACTATE AND CALCIUM CHLORIDE: 600; 310; 30; 20 INJECTION, SOLUTION INTRAVENOUS at 06:51

## 2019-09-30 RX ADMIN — SCOPALAMINE 1 PATCH: 1 PATCH, EXTENDED RELEASE TRANSDERMAL at 06:50

## 2019-09-30 RX ADMIN — PROPOFOL 165 MG: 10 INJECTION, EMULSION INTRAVENOUS at 07:49

## 2019-09-30 RX ADMIN — FENTANYL CITRATE 50 MCG: 50 INJECTION, SOLUTION INTRAMUSCULAR; INTRAVENOUS at 08:49

## 2019-09-30 RX ADMIN — ONDANSETRON 4 MG: 2 INJECTION INTRAMUSCULAR; INTRAVENOUS at 07:49

## 2019-09-30 RX ADMIN — OXYCODONE HYDROCHLORIDE 10 MG: 5 SOLUTION ORAL at 12:28

## 2019-09-30 RX ADMIN — FENTANYL CITRATE 100 MCG: 50 INJECTION, SOLUTION INTRAMUSCULAR; INTRAVENOUS at 08:16

## 2019-09-30 RX ADMIN — CEFAZOLIN 2 G: 1 INJECTION, POWDER, FOR SOLUTION INTRAVENOUS at 07:49

## 2019-09-30 RX ADMIN — DEXAMETHASONE SODIUM PHOSPHATE 5 MG: 10 INJECTION, SOLUTION INTRAMUSCULAR; INTRAVENOUS at 07:49

## 2019-09-30 RX ADMIN — ACETAMINOPHEN 1000 MG: 500 TABLET, FILM COATED ORAL at 07:36

## 2019-09-30 ASSESSMENT — PAIN SCALES - GENERAL: PAIN_LEVEL: 6

## 2019-09-30 NOTE — OR NURSING
1358- Pt DC'd home via w/c to private vehicle.  VSS.  Pt was able to void.  Ace wrap to chest cdi, B arm ace wraps both cdi.  Pt instructed on IS use, pt and daughter verbalized understanding.  Pt states painful when moving, but tolerable when resting in recliner.  Pt and daughter verbalized understanding of DC instructions.

## 2019-09-30 NOTE — OR NURSING
Pt allergies and NPO status verified, home meds reconcilled. Belongings secured. Pt verbalizes understanding of the pain scale, expected course of stay, and plan of care.  Surgical site verified with pt.  IV access established.  Sequentials/ teds placed on legs.

## 2019-09-30 NOTE — ANESTHESIA TIME REPORT
Anesthesia Start and Stop Event Times     Date Time Event    9/30/2019 0725 Ready for Procedure     0740 Anesthesia Start     1142 Anesthesia Stop        Responsible Staff  09/30/19    Name Role Begin End    Angel Dubose M.D. Anesth 0740 1142        Preop Diagnosis (Free Text):  Pre-op Diagnosis     BILAT ARM PTOSIS, BILAT HYPOMASTIA        Preop Diagnosis (Codes):    Post op Diagnosis  Hypomastia      Premium Reason  Non-Premium    Comments:

## 2019-09-30 NOTE — ANESTHESIA PROCEDURE NOTES
Airway  Date/Time: 9/30/2019 7:50 AM  Performed by: Angel Dubose M.D.  Authorized by: Angel Dubose M.D.     Location:  OR  Urgency:  Elective  Indications for Airway Management:  Anesthesia  Spontaneous Ventilation: absent    Sedation Level:  Deep  Preoxygenated: Yes    Final Airway Type:  Supraglottic airway  Final Supraglottic Airway:  Standard LMA  SGA Size:  4  Number of Attempts at Approach:  1

## 2019-09-30 NOTE — OP REPORT
DATE OF SERVICE:  09/30/2019    SERVICE:  Plastic surgery    SURGEON:  Cesar Carmona MD    ANESTHESIOLOGIST:  Angel Dubose MD    ANESTHESIA:  General.    PREOPERATIVE DIAGNOSES:  Bilateral hypomastia with ptosis and bilateral arm   skin and fat excess with laxity and ptosis.    POSTOPERATIVE DIAGNOSES:  Bilateral hypomastia with ptosis and bilateral arm   skin and fat excess with laxity and ptosis.    OPERATIVE PROCEDURES:  Bilateral augmentation mammoplasty with full   mastopexies and bilateral brachioplasties.    INDICATIONS:  A 49-year-old female, who on 10/24/2018 underwent a laparoscopic   gastric sleeve procedure and has lost a considerable amount of weight.  As a   result, she has left worse than right arm skin and fat excess with ptosis and   skin laxity.  It is moderately severe and she has less lipodystrophy of the   arms.  The patient also has bilateral hypomastia where the left breast is   slightly larger and longer than the right.  The patient wishes to undergo   these procedures under general anesthesia as an outpatient or with overnight   stay.  She wishes to use the Sientra smooth round moderate plus profile 355 mL   silicone implant on the smaller right breast and 325 mL silicone breast   implant on the larger left breast.  She understands risks, benefits, and   alternatives including but not limited to the risks of bleeding, hematoma,   seroma, infection, wound dehiscence, painful or unsightly scarring,   hypertrophic or keloid scarring, painful neuroma, sensory loss or decrease,   nipple sensory loss or decrease, nipple necrosis, nipple malposition,   asymmetry or irregularity; skin, fat, and breast necrosis; benign or malignant   breast disease, inability to nurse, injury to muscle, injury to ribs,   pneumothorax, DVT, pulmonary embolism, fat embolism, implant infection,   capsular contracture, deflation, rupture or leakage, extrusion or exposure;   implants that are too large or too small,  implant firmness, tenderness,   rippling, asymmetry or irregularity; complication related to silicone,   silicone granuloma, silicone lymphadenitis, silicone extrusion, migration,   palpability or visibility, ALCL; chronic breast or chest pain, lymphedema,   pigmentation change, stretch marks, ptosis, telangiectasias, lateral   displacement of implants, bottoming out, poor definition of cleavage and upper   poles; breasts that are too large or too small; injury to bone, tendon,   ligament, vessel, nerve, muscle, and skin; skin, fat, and muscle necrosis;   injury to axillary and arm contents, too much or too little skin resection   from the arms, arms that are too tight or too loose, persistent or recurrent   skin laxity with ptosis, persistent or recurrent asymmetry, changes with   weight gain or loss; changes with aging, medication, health condition, trauma,   infection, sun exposure, pregnancy and nursing, cardiovascular or   cardiorespiratory compromise, aspiration pneumonitis, hemorrhage, need for   transfusion; complication related to sutures, staples, Steri-Strips, Xeroform,   Ace wrap and dressings; complications related to general anesthesia,   mortality, and need for future revision.  The patient is motivated and signed   informed consent.    OPERATIVE REPORT:  The patient was marked preoperatively in sitting position   indicating midline as well as inframammary folds and level of second rib.  She   was marked for the bilateral full mastopexies with the Dahl pattern and it   was noted that she has bilateral hypomastia with third-degree ptosis and   significant stretch marks.  The left breast is larger and longer than the   right, and the left nipple-areolar complex and inframammary fold are slightly   higher.  The patient also was marked for her bilateral brachioplasty, marking   the bicipital groove as well as the posterior axillary crease.  It was noted   that the left has slightly more skin and ptosis  than the right.  The patient   again confirms she wishes to use the Sientra smooth round moderate plus   profile 355 mL silicone implant on the smaller right breast and 325 mL   silicone implant on the larger left breast.  The patient was then taken to the   OR where she was prepped and draped in supine position under general   anesthesia.  Sequential stockings were placed.  Starting on the smaller right   breast, it was infiltrated with 0.5% Xylocaine with 1:200,000 dilution   epinephrine.  The right nipple-areolar complex was marked and then incised   circumferentially at 38 mm in diameter.  Incisions were then made along the   Wise pattern full mastopexy markings with a 15 blade and deepithelialization   of the excess circumareolar and adjacent breast skin within the markings with   #15 blade and needle tip electrocautery.  Undermining was then performed in   the deep dermis creating a deep dermoglandular flap cephalad to achieve   appropriate tension closure.  At this point, hemostasis was secured using   electrocautery and wound was irrigated with triple antibiotic saline solution.    A 4.5 cm transverse incision was made in the mid lower right breast through   the subcutaneous fat and breast tissue with needle tip electrocautery down to   the pectoralis major muscle and fascia.  The muscle and fascia were then   elevated off of lower rib attachments creating a dual plane partial   submuscular pocket up to the level of the second and third ribs, medially to   midline, laterally to junction of mid and anterior axillary line and minimally   lowering the inframammary fold.  Hemostasis was secured using electrocautery   and wound was irrigated with triple antibiotic saline solution.  At this   point, the full mastopexy was closed partly by repositioning the   nipple-areolar complex cephalad into the keyhole incision using interrupted   buried dermal followed by running subcuticular 4-0 Monocryl sutures.  The    transverse incision was closed using interrupted buried dermal 3-0 Monocryl   suture followed by running subcuticular V-Loc 90 3-0 resorbable suture from   medial to lateral with exteriorized knot.  Finally, the Sientra smooth round   moderate plus profile 355 mL silicone implant was irrigated with triple   antibiotic saline solution and placed in the right breast dual plane partial   submuscular position through the midline incision and then the breast tissue   incision was reapproximated using interrupted buried 3-0 Monocryl sutures.    The vertical incision was temporarily closed using interrupted buried dermal   3-0 Monocryl sutures.  Excellent capillary refill and perfusion was noted to   right nipple-areolar complex and adjacent breast skin at the end of procedure   with highly satisfactory correction of hypomastia and ptosis.  Similar   procedure was performed on the slightly larger left breast with similar   marking, infiltration, circumareolar incision at 38 mm in diameter, and then   incisions along the full mastopexy markings with deepithelialization,   undermining in deep dermis and creating an upper dermoglandular pedicle.    Hemostasis secured using electrocautery and wound irrigated with triple   antibiotic saline solution.  A 4.5 cm transverse incision was made in the mid   lower left breast through subcutaneous fat and breast tissue with needle tip   electrocautery down to the pectoralis major muscle and fascia, which were   elevated off of lower rib attachments creating a dual plane partial   submuscular pocket along the same dimensions.  The inframammary fold was also   dissected minimally to match the other side.  Hemostasis was secured using   electrocautery and wound irrigated with triple antibiotic saline solution.    The left nipple-areolar complex was repositioned cephalad into the keyhole   incision using interrupted buried dermal followed by running subcuticular 4-0   Monocryl sutures.   The transverse incision was closed using interrupted buried   dermal 3-0 Monocryl sutures followed by running subcuticular V-Loc 90 3-0   resorbable suture from medial to lateral with exteriorized knot.  Through the   vertical incision, the Sientra smooth round moderate plus profile 325 mL   silicone implant was irrigated with triple antibiotic saline solution and   placed in the dual plane partial submuscular position.  The breast incision   was then closed using interrupted buried 3-0 Monocryl sutures and the vertical   incision was temporarily closed using interrupted buried dermal 3-0 Monocryl   sutures.  The patient was placed in sitting and supine positions and noted to   have excellent symmetry, and then the vertical incisions were closed   superficially using running subcuticular 3-0 Monocryl sutures.  A few   interrupted 4-0 Monocryl sutures were placed.  There was minimal bleeding and   no complications.  All counts correct at the end of procedure.  No drains   required and excellent capillary refill and perfusion of bilateral   nipple-areolar complexes and adjacent breast skin at the end of the procedure.    Steri-Strips were applied without benzoin and the breasts were dressed with   Xeroform followed by fluff dry gauze and ABD pads, secured snug but not too   tightly with 6-inch Ace wrap.  At this point, the patient was reprepped and   draped in supine position with arms abducted on arm boards to undergo   bilateral brachioplasty.  Starting on the less severe right side, a   longitudinal incision was made in the bicipital groove just distal to the   elbow and all the way proximally to the posterior axillary crease.  It was   then made vertically in the axilla.  This was done with a 15 blade through   skin and subcutaneous tissue and then needle tip electrocautery through   subcutaneous fat down to the superficial fascia.  In the superficial fascial   plane, dissection was then carried posteriorly in the  mid and proximal area   about 10-12 cm.  Hemostasis secured using electrocautery and wound irrigated   with triple antibiotic saline solution.  Transverse cuts were then made with   curved Pineda scissors for appropriate length and then tailor tacking closure   was performed at intervals using interrupted buried dermal 3-0 Monocryl   sutures.  The excess skin was resected and weighed for a total of 51 g on the   slightly smaller right side.  This was discarded.  The rest of the incision   was closed using interrupted buried dermal 3-0 Monocryl sutures followed by   surgical staples to close the incisions.  The patient had excellent capillary   refill and perfusion of the right arm and hand with excellent correction.  The   right arm was dressed with Xeroform followed by ABD pads, secured snug, but   not too tightly with a couple of sterile 4-inch Ace wraps.  The exact same   procedure was performed on slightly larger side with similar infiltration of   0.5% Xylocaine with 1:200,000 dilution epinephrine, which was performed on the   right side and then the similar incisions in the posterior axillary crease as   well as the bicipital groove just distal to the elbow with a 15 blade.    Needle tip electrocautery was used to dissect through subcutaneous fat down to   the superficial fascia.  Dissection was carried posteriorly for about the   same distance within the same fascial plane.  Hemostasis secured using   electrocautery and wound was irrigated with triple antibiotic saline solution.    At this point, some transverse incisions were made with a curved Pineda   scissors and tailor tacking closure under appropriate tension was performed   using interrupted buried dermal 3-0 Monocryl sutures.  The rest of the excess   skin and fat was resected and weighed for a total of 60 g and then discarded.    Hemostasis secured and wound irrigated.  The incision was closed using   interrupted buried dermal 3-0 Monocryl sutures  followed by surgical staples.    Highly satisfactory symmetry and improvement was noted as well as capillary   refill and perfusion.  The left arm incision line was dressed with Xeroform   followed by ABD pads, secured snug, but not too tightly with a couple of   sterile 4-inch Ace wraps.  In total, the patient had approximately 120 mL   blood loss, no complications.  All counts correct at the end of procedure.    She was awakened from anesthesia, extubated and returned to recovery room in   stable condition.       ____________________________________     MD DARBY MARQUEZ / NTS    DD:  09/30/2019 12:02:19  DT:  09/30/2019 12:36:09    D#:  [id number]  Job#:  [id number]

## 2019-09-30 NOTE — ANESTHESIA POSTPROCEDURE EVALUATION
Patient: Samantha Baker    Procedure Summary     Date:  09/30/19 Room / Location:   OR 03 / SURGERY [facility]    Anesthesia Start:  0740 Anesthesia Stop:  1142    Procedures:       BRACHIOPLASTY (Bilateral Arm Upper)      MAMMOPLASTY, AUGMENTATION (Bilateral Breast)      INSERTION, IMPLANT, BREAST- SILICONE AND FULL BREAST LIFTS (Bilateral Breast) Diagnosis:  (BILAT ARM PTOSIS, BILAT HYPOMASTIA)    Surgeon:  Stan Carmona M.D. Responsible Provider:  Angel Dubose M.D.    Anesthesia Type:  general ASA Status:  2          Final Anesthesia Type: general  Last vitals  BP   Blood Pressure: 104/59    Temp   37.2 °C (99 °F)    Pulse   Pulse: (!) 56   Resp   16    SpO2   98 %      Anesthesia Post Evaluation    Patient location during evaluation: PACU  Patient participation: complete - patient participated  Level of consciousness: awake and alert  Pain score: 6    Airway patency: patent  Anesthetic complications: no  Cardiovascular status: hemodynamically stable  Respiratory status: acceptable  Hydration status: euvolemic    PONV: none           Nurse Pain Score: 6 (NPRS)

## 2019-09-30 NOTE — DISCHARGE INSTRUCTIONS
ACTIVITY: Rest and take it easy for the first 24 hours.  A responsible adult is recommended to remain with you during that time.  It is normal to feel sleepy.  We encourage you to not do anything that requires balance, judgment or coordination.    MILD FLU-LIKE SYMPTOMS ARE NORMAL. YOU MAY EXPERIENCE GENERALIZED MUSCLE ACHES, THROAT IRRITATION, HEADACHE AND/OR SOME NAUSEA.    FOR 24 HOURS DO NOT:  Drive, operate machinery or run household appliances.  Drink beer or alcoholic beverages.   Make important decisions or sign legal documents.    SPECIAL INSTRUCTIONS: Keep head of bed up 30 degrees  Incentive spirometer as instructed  Keep arms above level of heart    DIET: To avoid nausea, slowly advance diet as tolerated, avoiding spicy or greasy foods for the first day.  Add more substantial food to your diet according to your physician's instructions.  Babies can be fed formula or breast milk as soon as they are hungry.  INCREASE FLUIDS AND FIBER TO AVOID CONSTIPATION.    SURGICAL DRESSING/BATHING: Keep clean dry and intact Per DR Carmona instructions      FOLLOW-UP APPOINTMENT:  A follow-up appointment Tomorrow as scheduled    You should CALL YOUR PHYSICIAN if you develop:  Fever greater than 101 degrees F.  Pain not relieved by medication, or persistent nausea or vomiting.  Excessive bleeding (blood soaking through dressing) or unexpected drainage from the wound.  Extreme redness or swelling around the incision site, drainage of pus or foul smelling drainage.  Inability to urinate or empty your bladder within 8 hours.  Problems with breathing or chest pain.    You should call 911 if you develop problems with breathing or chest pain.  If you are unable to contact your doctor or surgical center, you should go to the nearest emergency room or urgent care center.  Physician's telephone #: [phone]    If any questions arise, call your doctor.  If your doctor is not available, please feel free to call the Surgical Center  at [phone].  The Center is open Monday through Friday from 7AM to 7PM.  You can also call the HEALTH HOTLINE open 24 hours/day, 7 days/week and speak to a nurse at [phone], or toll free at [phone].    A registered nurse may call you a few days after your surgery to see how you are doing after your procedure.    MEDICATIONS: Resume taking daily medication.  Take prescribed pain medication with food.  If no medication is prescribed, you may take non-aspirin pain medication if needed.  PAIN MEDICATION CAN BE VERY CONSTIPATING.  Take a stool softener or laxative such as senokot, pericolace, or milk of magnesia if needed.    Prescription at home.  Last pain medication given at 1230    If your physician has prescribed pain medication that includes Acetaminophen (Tylenol), do not take additional Acetaminophen (Tylenol) while taking the prescribed medication.    Depression / Suicide Risk    As you are discharged from this [facility] facility, it is important to learn how to keep safe from harming yourself.    Recognize the warning signs:  · Abrupt changes in personality, positive or negative- including increase in energy   · Giving away possessions  · Change in eating patterns- significant weight changes-  positive or negative  · Change in sleeping patterns- unable to sleep or sleeping all the time   · Unwillingness or inability to communicate  · Depression  · Unusual sadness, discouragement and loneliness  · Talk of wanting to die  · Neglect of personal appearance   · Rebelliousness- reckless behavior  · Withdrawal from people/activities they love  · Confusion- inability to concentrate     If you or a loved one observes any of these behaviors or has concerns about self-harm, here's what you can do:  · Talk about it- your feelings and reasons for harming yourself  · Remove any means that you might use to hurt yourself (examples: pills, rope, extension cords, firearm)  · Get professional help from the  community (Mental Health, Substance Abuse, psychological counseling)  · Do not be alone:Call your Safe Contact- someone whom you trust who will be there for you.  · Call your local CRISIS HOTLINE 548-5495 or 540-817-1039  · Call your local Children's Mobile Crisis Response Team [place] (178) 798-0071 or www.[employer]  · Call the toll free National Suicide Prevention Hotlines   · National Suicide Prevention Lifeline 156-897-SBGL (0712)  · National Hope Line Network 800-SUICIDE (129-9456)

## 2019-09-30 NOTE — OR SURGEON
Immediate Post OP Note    PreOp Diagnosis: Bilateral hypomastia with ptosis and bilateral arm skin laxity with ptosis    PostOp Diagnosis: Same    Procedure(s):  BRACHIOPLASTY  MAMMOPLASTY, AUGMENTATION  INSERTION, IMPLANT, BREAST- SILICONE AND FULL BREAST LIFTS    Surgeon(s):  Stan Carmona M.D.    Anesthesiologist/Type of Anesthesia:  Anesthesiologist: Angel Dubose M.D./General    Surgical Staff:  Circulator: Abebe Cardenas R.N.  Scrub Person: Pratik Arias    Specimens removed if any:  * No specimens in log *    Estimated Blood Loss: 120 cc    Findings: See dictation    Complications: None        9/30/2019 11:30 AM Stan Carmona M.D.

## 2019-09-30 NOTE — OR NURSING
1138 received from or  Oral airway intact  resp spont with chin lift  Chest dressing c/d/i  Bilateral upper arm dressings c/d/i  bilat hands warm with 2+ radial [id number]  Awake  Oral airway dc'd  resp spont  No c/o pain  1515 meets discharge criteria

## 2019-10-17 ENCOUNTER — OFFICE VISIT (OUTPATIENT)
Dept: URGENT CARE | Facility: CLINIC | Age: 49
End: 2019-10-17
Payer: COMMERCIAL

## 2019-10-17 VITALS
OXYGEN SATURATION: 95 % | BODY MASS INDEX: 22.63 KG/M2 | SYSTOLIC BLOOD PRESSURE: 102 MMHG | DIASTOLIC BLOOD PRESSURE: 62 MMHG | TEMPERATURE: 99.1 F | HEIGHT: 62 IN | WEIGHT: 123 LBS | HEART RATE: 77 BPM

## 2019-10-17 DIAGNOSIS — M79.605 PAIN OF LEFT LEG: ICD-10-CM

## 2019-10-17 PROCEDURE — 99213 OFFICE O/P EST LOW 20 MIN: CPT | Performed by: PHYSICIAN ASSISTANT

## 2019-10-18 ENCOUNTER — HOSPITAL ENCOUNTER (OUTPATIENT)
Dept: LAB | Facility: MEDICAL CENTER | Age: 49
End: 2019-10-18
Attending: NURSE PRACTITIONER
Payer: COMMERCIAL

## 2019-10-18 ENCOUNTER — HOSPITAL ENCOUNTER (OUTPATIENT)
Dept: RADIOLOGY | Facility: MEDICAL CENTER | Age: 49
End: 2019-10-18
Attending: NURSE PRACTITIONER
Payer: COMMERCIAL

## 2019-10-18 ENCOUNTER — OFFICE VISIT (OUTPATIENT)
Dept: MEDICAL GROUP | Facility: MEDICAL CENTER | Age: 49
End: 2019-10-18
Payer: COMMERCIAL

## 2019-10-18 VITALS
HEIGHT: 62 IN | BODY MASS INDEX: 23 KG/M2 | WEIGHT: 125 LBS | HEART RATE: 70 BPM | TEMPERATURE: 98.1 F | RESPIRATION RATE: 16 BRPM | OXYGEN SATURATION: 98 %

## 2019-10-18 DIAGNOSIS — M79.605 PAIN OF LEFT LOWER EXTREMITY: ICD-10-CM

## 2019-10-18 DIAGNOSIS — Z98.82 S/P BREAST AUGMENTATION: ICD-10-CM

## 2019-10-18 LAB — D DIMER PPP IA.FEU-MCNC: 2.13 UG/ML (FEU) (ref 0–0.5)

## 2019-10-18 PROCEDURE — 93971 EXTREMITY STUDY: CPT | Mod: LT

## 2019-10-18 PROCEDURE — 99214 OFFICE O/P EST MOD 30 MIN: CPT | Performed by: NURSE PRACTITIONER

## 2019-10-18 PROCEDURE — 36415 COLL VENOUS BLD VENIPUNCTURE: CPT

## 2019-10-18 PROCEDURE — 85379 FIBRIN DEGRADATION QUANT: CPT

## 2019-10-18 RX ORDER — OXYCODONE AND ACETAMINOPHEN 7.5; 325 MG/1; MG/1
TABLET ORAL
Refills: 0 | COMMUNITY
Start: 2019-09-17 | End: 2019-12-29

## 2019-10-18 RX ORDER — FLUCONAZOLE 150 MG/1
150 TABLET ORAL
Refills: 1 | COMMUNITY
Start: 2019-09-17 | End: 2019-12-29

## 2019-10-18 RX ORDER — CEPHALEXIN 500 MG/1
500 CAPSULE ORAL
Refills: 1 | COMMUNITY
Start: 2019-09-17 | End: 2019-12-29

## 2019-10-18 RX ORDER — PROMETHAZINE HYDROCHLORIDE 25 MG/1
25 TABLET ORAL
Refills: 1 | COMMUNITY
Start: 2019-09-17 | End: 2019-12-29

## 2019-10-18 NOTE — ASSESSMENT & PLAN NOTE
Had breast augmentation and bilateral brachioplasty 2 weeks ago. About 1 week ago when she crossed her legs she felt a hot, searing pain in the pack of her left thigh above popliteal fossa. Was seen in Urgent Care yesterday and nothing was seen, no imaging was done.     Symptoms occur daily during certain activities, area is usually tender to palpation after pain occurs.     No pain in the morning, but as she is up and around during the day and crossing her leg it hurts more.     Yesterday felt more in her her left calf.     No swelling or redness in leg. Is tender to palpation.     No personal or family history of blood clots. Not on hormonal birth control, doesn't smoke.     Hasn't been wearing compression stockings, was supposed to wear them 24/7 for two weeks. Has only been wearing them at home.     No chest pain, shortness of breath, anxiety, heart palpitations, dizziness.

## 2019-10-18 NOTE — PROGRESS NOTES
Subjective:   Samantha Baker is a 49 y.o. female here today for post-op leg pain    Pain of left lower extremity  Had breast augmentation and bilateral brachioplasty 2 weeks ago. About 1 week ago when she crossed her legs she felt a hot, searing pain in the pack of her left thigh above popliteal fossa. Was seen in Urgent Care yesterday and nothing was seen, no imaging was done.     Symptoms occur daily during certain activities, area is usually tender to palpation after pain occurs.     No pain in the morning, but as she is up and around during the day and crossing her leg it hurts more.     Yesterday felt more in her her left calf.     No swelling or redness in leg. Is tender to palpation.     No personal or family history of blood clots. Not on hormonal birth control, doesn't smoke.     Hasn't been wearing compression stockings, was supposed to wear them 24/7 for two weeks. Has only been wearing them at home.     No chest pain, shortness of breath, anxiety, heart palpitations, dizziness.     S/P breast augmentation  2 weeks ago with bilateral brachioplasty.        Current medicines (including changes today)  Current Outpatient Medications   Medication Sig Dispense Refill   • Cyanocobalamin (B-12) 3000 MCG SL Tab Place  under tongue every 7 days.     • Probiotic Product (PROBIOTIC-10 PO) Take  by mouth as needed.       No current facility-administered medications for this visit.      She  has a past medical history of Acne rosacea (9/4/2011), Hemorrhoids, High cholesterol (09/2019), History of sleeve gastrectomy, History of tobacco use (9/4/2011), Hyperlipidemia (6/8/2011), and Metabolic syndrome (9/4/2011). She also has no past medical history of GERD (gastroesophageal reflux disease).    ROS  No chest pain, no shortness of breath, no abdominal pain  Positive ROS as per HPI.  All other systems reviewed and are negative.     Objective:     Pulse 70   Temp 36.7 °C (98.1 °F) (Temporal)   Resp 16   Ht 1.575 m  "(5' 2\")   Wt 56.7 kg (125 lb)   SpO2 98%  Body mass index is 22.86 kg/m².     Physical Exam:  Constitutional: Alert, no distress.  Skin: Warm, dry, good turgor, no rashes in visible areas.  Eye: Equal, round and reactive, conjunctiva clear, lids normal.  ENMT: Lips without lesions, good dentition, oropharynx clear.  Neck: Trachea midline, no masses, no thyromegaly. No cervical or supraclavicular lymphadenopathy  Respiratory: Unlabored respiratory effort, lungs clear to auscultation, no wheezes, no ronchi.  Cardiovascular: Normal S1, S2, no murmur, no edema.  Psych: Alert and oriented x3, normal affect and mood.  MSK: No LE edema, erythema. Tenderness to palpation of left posterior/medial thigh above popliteal fossa. No calf tenderness.       Assessment and Plan:   The following treatment plan was discussed    1. Pain of left lower extremity  Unstable  Concern for DVT due to recent surgery.   Other DDX include muscle strain or sciatica.   Stat US ordered, scheduled at 545 at [facility] (only availability today)  Check Stat D Dimer  If D Dimer significantly elevated, I will have patient go to ER instead. If not, will wait for US.   - US-EXTREMITY VENOUS LOWER UNILAT LEFT; Future  - D-DIMER; Future    2. S/P breast augmentation  - US-EXTREMITY VENOUS LOWER UNILAT LEFT; Future  - D-DIMER; Future    Strict ER precautions advised for sudden onset chest pain, shortness of breath, dizziness, heart palpitations, or other concerning symptoms.     Followup: Return pending results.    I have placed the below orders and discussed them with an approved delegating provider. The MA is performing the below orders under the direction of Dr. Guillermo           "

## 2019-10-21 ASSESSMENT — ENCOUNTER SYMPTOMS
LEG PAIN: 1
CHILLS: 0
VOMITING: 0
ABDOMINAL PAIN: 0
SHORTNESS OF BREATH: 0
FEVER: 0
NUMBNESS: 0
NAUSEA: 0
DIZZINESS: 0
DIARRHEA: 0
WEAKNESS: 0

## 2019-10-21 NOTE — PROGRESS NOTES
"Subjective:      Samantha Baker is a 49 y.o. female who presents with Leg Pain (Left leg pain in her hamstring that has moved down to her calf.  )            Leg Pain   This is a new problem. The current episode started 1 to 4 weeks ago (1 week). The problem occurs intermittently. The problem has been unchanged. Pertinent negatives include no abdominal pain, chest pain, chills, congestion, fever, nausea, numbness, rash, vomiting or weakness. Exacerbated by: palpation. She has tried nothing for the symptoms.     Patient presents to urgent care reporting a 1 week history of right leg pain. She states she felt a searing pain on the back of her thigh after crossing her legs. She is 2 weeks s/p breast augmentation surgery. She denies history of smoking, use of estrogen, lower extremity edema, chest pain, SOB, or history of blood clots. No chest pain, hemoptysis, or SOB.     Review of Systems   Constitutional: Negative for chills and fever.   HENT: Negative for congestion.    Respiratory: Negative for shortness of breath.    Cardiovascular: Negative for chest pain.   Gastrointestinal: Negative for abdominal pain, diarrhea, nausea and vomiting.   Genitourinary: Negative.    Musculoskeletal:        + leg pain   Skin: Negative for rash.   Neurological: Negative for dizziness, weakness and numbness.        Objective:     /62 (BP Location: Right arm)   Pulse 77   Temp 37.3 °C (99.1 °F)   Ht 1.575 m (5' 2\")   Wt 55.8 kg (123 lb)   SpO2 95%   BMI 22.50 kg/m²      Physical Exam   Constitutional: She is oriented to person, place, and time. She appears well-developed and well-nourished. No distress.   HENT:   Head: Normocephalic and atraumatic.   Eyes: Pupils are equal, round, and reactive to light. Conjunctivae are normal. Right eye exhibits no discharge. Left eye exhibits no discharge.   Neck: Normal range of motion.   Cardiovascular: Normal rate, regular rhythm and normal heart sounds.   Pulmonary/Chest: Effort " normal. No stridor. No respiratory distress. She has no wheezes.   Musculoskeletal: Normal range of motion.        Legs:  No erythema, edema, ecchymosis, or warmth to touch of lower extremities bilaterally. Patient reports pain on posterior distal hamstring. No palpable defects or masses.    Neurological: She is alert and oriented to person, place, and time.   Skin: Skin is warm and dry. She is not diaphoretic.   Psychiatric: She has a normal mood and affect. Her behavior is normal.   Nursing note and vitals reviewed.       PMH:  has a past medical history of Acne rosacea (9/4/2011), Hemorrhoids, High cholesterol (09/2019), History of sleeve gastrectomy, History of tobacco use (9/4/2011), Hyperlipidemia (6/8/2011), and Metabolic syndrome (9/4/2011). She also has no past medical history of GERD (gastroesophageal reflux disease).  MEDS:   Current Outpatient Medications:   •  Cyanocobalamin (B-12) 3000 MCG SL Tab, Place  under tongue every 7 days., Disp: , Rfl:   •  Probiotic Product (PROBIOTIC-10 PO), Take  by mouth as needed., Disp: , Rfl:   •  cephALEXin (KEFLEX) 500 MG Cap, Take 500 mg by mouth., Disp: , Rfl: 1  •  fluconazole (DIFLUCAN) 150 MG tablet, Take 150 mg by mouth 1 time daily as needed., Disp: , Rfl: 1  •  oxyCODONE-acetaminophen (PERCOCET) 7.5-325 MG per tablet, TAKE 1-2 TABS BY MOUTH EVERY 4-6 HOURS AS NEEDED FOR 7 DAYS. G89.18, Disp: , Rfl: 0  •  promethazine (PHENERGAN) 25 MG Tab, Take 25 mg by mouth., Disp: , Rfl: 1  ALLERGIES:   Allergies   Allergen Reactions   • Macrobid [Nitrofurantoin] Itching     SURGHX:   Past Surgical History:   Procedure Laterality Date   • PB EXCISE EXCESS SKIN TISSUE,ARM Bilateral 9/30/2019    Procedure: BRACHIOPLASTY;  Surgeon: Stan Carmona M.D.;  Location: [facility];  Service: Plastics   • MAMMOPLASTY AUGMENTATION Bilateral 9/30/2019    Procedure: MAMMOPLASTY, AUGMENTATION;  Surgeon: Stan Carmona M.D.;  Location: [facility];  Service:  Plastics   • BREAST IMPLANT REVISION Bilateral 9/30/2019    Procedure: INSERTION, IMPLANT, BREAST- SILICONE AND FULL BREAST LIFTS;  Surgeon: Stan Carmona M.D.;  Location: SURGERY [facility];  Service: Plastics   • GASTRIC BYPASS LAPAROSCOPIC  10/2018    Gastric sleeve   • CERVICAL DISK AND FUSION ANTERIOR  3/8/2016    Procedure: CERVICAL DISK AND FUSION ANTERIOR C6-7;  Surgeon: Jonas Ramos M.D.;  Location: SURGERY [facility];  Service:    • GM BY LAPAROSCOPY  11/27/08    Performed by LUCAS HILL at SURGERY [facility]     SOCHX:  reports that she quit smoking about 11 years ago. Her smoking use included cigarettes. She has a 10.00 pack-year smoking history. She has never used smokeless tobacco. She reports that she does not drink alcohol or use drugs.  FH: family history includes Cancer in her maternal aunt; Heart Attack in her maternal grandfather; No Known Problems in her brother, father, mother, and sister.       Assessment/Plan:     1. Pain of left leg    No evidence of DVT at today's visit, clinical presentation not consistent with blood clot. No imaging indicated at this time. Encouraged warm compresses and use of OTC nsaids as needed for symptomatic relief. RTC or follow up with primary care if symptoms persist/worsen. The patient demonstrated a good understanding and agreed with the treatment plan.     no

## 2019-12-29 ENCOUNTER — HOSPITAL ENCOUNTER (OUTPATIENT)
Facility: MEDICAL CENTER | Age: 49
End: 2019-12-29
Attending: FAMILY MEDICINE
Payer: COMMERCIAL

## 2019-12-29 ENCOUNTER — OFFICE VISIT (OUTPATIENT)
Dept: URGENT CARE | Facility: CLINIC | Age: 49
End: 2019-12-29
Payer: COMMERCIAL

## 2019-12-29 VITALS
HEIGHT: 62 IN | WEIGHT: 124 LBS | OXYGEN SATURATION: 97 % | HEART RATE: 73 BPM | RESPIRATION RATE: 16 BRPM | BODY MASS INDEX: 22.82 KG/M2 | TEMPERATURE: 99 F | SYSTOLIC BLOOD PRESSURE: 110 MMHG | DIASTOLIC BLOOD PRESSURE: 70 MMHG

## 2019-12-29 DIAGNOSIS — R30.9 PAINFUL URINATION: ICD-10-CM

## 2019-12-29 DIAGNOSIS — N89.8 VAGINAL DISCHARGE: ICD-10-CM

## 2019-12-29 LAB
APPEARANCE UR: CLEAR
BILIRUB UR STRIP-MCNC: NEGATIVE MG/DL
COLOR UR AUTO: YELLOW
GLUCOSE UR STRIP.AUTO-MCNC: NEGATIVE MG/DL
KETONES UR STRIP.AUTO-MCNC: NEGATIVE MG/DL
LEUKOCYTE ESTERASE UR QL STRIP.AUTO: ABNORMAL
NITRITE UR QL STRIP.AUTO: ABNORMAL
PH UR STRIP.AUTO: 5 [PH] (ref 5–8)
PROT UR QL STRIP: NEGATIVE MG/DL
RBC UR QL AUTO: ABNORMAL
SP GR UR STRIP.AUTO: 1.02
UROBILINOGEN UR STRIP-MCNC: 0.2 MG/DL

## 2019-12-29 PROCEDURE — 99214 OFFICE O/P EST MOD 30 MIN: CPT | Performed by: FAMILY MEDICINE

## 2019-12-29 PROCEDURE — 87591 N.GONORRHOEAE DNA AMP PROB: CPT

## 2019-12-29 PROCEDURE — 87186 SC STD MICRODIL/AGAR DIL: CPT

## 2019-12-29 PROCEDURE — 87077 CULTURE AEROBIC IDENTIFY: CPT

## 2019-12-29 PROCEDURE — 87480 CANDIDA DNA DIR PROBE: CPT

## 2019-12-29 PROCEDURE — 87491 CHLMYD TRACH DNA AMP PROBE: CPT

## 2019-12-29 PROCEDURE — 87510 GARDNER VAG DNA DIR PROBE: CPT

## 2019-12-29 PROCEDURE — 87660 TRICHOMONAS VAGIN DIR PROBE: CPT

## 2019-12-29 PROCEDURE — 81002 URINALYSIS NONAUTO W/O SCOPE: CPT | Performed by: FAMILY MEDICINE

## 2019-12-29 PROCEDURE — 87086 URINE CULTURE/COLONY COUNT: CPT

## 2019-12-29 RX ORDER — CEFDINIR 300 MG/1
300 CAPSULE ORAL 2 TIMES DAILY
Qty: 10 CAP | Refills: 0 | Status: SHIPPED | OUTPATIENT
Start: 2019-12-29 | End: 2020-01-03

## 2019-12-30 DIAGNOSIS — R30.9 PAINFUL URINATION: ICD-10-CM

## 2019-12-30 DIAGNOSIS — N89.8 VAGINAL DISCHARGE: ICD-10-CM

## 2019-12-30 LAB
C TRACH DNA SPEC QL NAA+PROBE: NEGATIVE
CANDIDA DNA VAG QL PROBE+SIG AMP: NEGATIVE
G VAGINALIS DNA VAG QL PROBE+SIG AMP: NEGATIVE
N GONORRHOEA DNA SPEC QL NAA+PROBE: NEGATIVE
SPECIMEN SOURCE: NORMAL
T VAGINALIS DNA VAG QL PROBE+SIG AMP: NEGATIVE

## 2019-12-30 NOTE — PROGRESS NOTES
"Subjective:      Samantha Baker is a 49 y.o. female who presents with Painful Urination (x 6 weeks )            This new problem  49-year-old with past history of urinary tract infection presenting for intermittent history of painful urination for the past few weeks.  She states that she has had this before and usually did not want to come early because sometimes things do not show up right away.  She denies any abdominal pain, nausea or vomiting, fever or chills.  She does have some lower back pain.  Remote history of kidney stone reported.  She does not feel this is a kidney stone.  She also has noticed some vaginal irritation and discharge which has a fishy odor after intercourse.  She is not concerned about STIs and sexually active with one partner only.  She does get UTIs after sexual intercourse.      Review of Systems   All other systems reviewed and are negative.         Objective:     /70   Pulse 73   Temp 37.2 °C (99 °F) (Temporal)   Resp 16   Ht 1.575 m (5' 2\")   Wt 56.2 kg (124 lb)   LMP 12/15/2019   SpO2 97%   BMI 22.68 kg/m²      Physical Exam  Constitutional:       General: She is not in acute distress.     Appearance: She is not ill-appearing, toxic-appearing or diaphoretic.   HENT:      Head: Normocephalic and atraumatic.      Right Ear: External ear normal.      Left Ear: External ear normal.      Nose: Nose normal.   Eyes:      Conjunctiva/sclera: Conjunctivae normal.   Neck:      Musculoskeletal: Neck supple. No muscular tenderness.   Cardiovascular:      Rate and Rhythm: Normal rate and regular rhythm.      Heart sounds: No murmur. No friction rub.   Pulmonary:      Effort: Pulmonary effort is normal. No respiratory distress.      Breath sounds: No stridor. No wheezing, rhonchi or rales.   Abdominal:      Tenderness: There is no right CVA tenderness or left CVA tenderness.   Lymphadenopathy:      Cervical: No cervical adenopathy.   Skin:     General: Skin is warm.      " Coloration: Skin is not jaundiced or pale.      Findings: No rash.   Neurological:      General: No focal deficit present.      Mental Status: She is alert.   Psychiatric:         Mood and Affect: Mood normal.             Results for orders placed or performed in visit on 12/29/19   POCT Urinalysis   Result Value Ref Range    POC Color yellow Negative    POC Appearance clear Negative    POC Leukocyte Esterase trace Negative    POC Nitrites negaitve Negative    POC Urobiligen 0.2 Negative (0.2) mg/dL    POC Protein negative Negative mg/dL    POC Urine PH 5.0 5.0 - 8.0    POC Blood moderate Negative    POC Specific Gravity 1.020 <1.005 - >1.030    POC Ketones negative Negative mg/dL    POC Bilirubin negative Negative mg/dL    POC Glucose negative Negative mg/dL          Assessment/Plan:       1. Painful urination  - POCT Urinalysis  - cefdinir (OMNICEF) 300 MG Cap; Take 1 Cap by mouth 2 times a day for 5 days.  Dispense: 10 Cap; Refill: 0  - URINE CULTURE(NEW); Future    2. Vaginal discharge  - VAGINAL PATHOGENS DNA PANEL; Future  - Chlamydia/GC PCR Urine Or Swab; Future      Based on history and physical she likely has a urinary tract infection we will treat with Ceftin ear based on the last year urine culture which had pan sensitivity.  Because of a history of vaginal discharge and irritation, recommended to obtain a vaginal pathology sample also test for GC chlamydia even though it is low yield in this case and she agreed to do so.  Treatment pending results.  In regards to getting urinary tract infection after intercourse, recommended to follow-up with primary care doctor to discuss possible treatment

## 2020-01-08 ENCOUNTER — OFFICE VISIT (OUTPATIENT)
Dept: MEDICAL GROUP | Facility: MEDICAL CENTER | Age: 50
End: 2020-01-08
Payer: COMMERCIAL

## 2020-01-08 ENCOUNTER — HOSPITAL ENCOUNTER (OUTPATIENT)
Facility: MEDICAL CENTER | Age: 50
End: 2020-01-08
Attending: NURSE PRACTITIONER
Payer: COMMERCIAL

## 2020-01-08 VITALS
DIASTOLIC BLOOD PRESSURE: 64 MMHG | SYSTOLIC BLOOD PRESSURE: 110 MMHG | OXYGEN SATURATION: 98 % | BODY MASS INDEX: 23.19 KG/M2 | HEART RATE: 92 BPM | WEIGHT: 126 LBS | HEIGHT: 62 IN | TEMPERATURE: 97.8 F

## 2020-01-08 DIAGNOSIS — N30.00 ACUTE CYSTITIS WITHOUT HEMATURIA: ICD-10-CM

## 2020-01-08 DIAGNOSIS — J02.9 SORE THROAT: ICD-10-CM

## 2020-01-08 LAB
APPEARANCE UR: CLEAR
BILIRUB UR STRIP-MCNC: NEGATIVE MG/DL
COLOR UR AUTO: YELLOW
GLUCOSE UR STRIP.AUTO-MCNC: NEGATIVE MG/DL
INT CON NEG: NORMAL
INT CON POS: NORMAL
KETONES UR STRIP.AUTO-MCNC: NEGATIVE MG/DL
LEUKOCYTE ESTERASE UR QL STRIP.AUTO: NEGATIVE
NITRITE UR QL STRIP.AUTO: NEGATIVE
PH UR STRIP.AUTO: 5.5 [PH] (ref 5–8)
PROT UR QL STRIP: NEGATIVE MG/DL
RBC UR QL AUTO: NORMAL
S PYO AG THROAT QL: NEGATIVE
SP GR UR STRIP.AUTO: >=1.03
UROBILINOGEN UR STRIP-MCNC: 0.2 MG/DL

## 2020-01-08 PROCEDURE — 99213 OFFICE O/P EST LOW 20 MIN: CPT | Performed by: NURSE PRACTITIONER

## 2020-01-08 PROCEDURE — 87086 URINE CULTURE/COLONY COUNT: CPT

## 2020-01-08 PROCEDURE — 87880 STREP A ASSAY W/OPTIC: CPT | Performed by: NURSE PRACTITIONER

## 2020-01-08 PROCEDURE — 81002 URINALYSIS NONAUTO W/O SCOPE: CPT | Performed by: NURSE PRACTITIONER

## 2020-01-08 NOTE — ASSESSMENT & PLAN NOTE
Ongoing for 2 days with hoarse voice. No fevers, chills, body aches. Daughter was recently diagnosed with strep.

## 2020-01-08 NOTE — ASSESSMENT & PLAN NOTE
Was treated for UTI 2 weeks ago with Cefdinir. Culture positive for E coli sensitive to cephalosporins. Still having some discomfort while urinating, unsure if she is dehydrated or not, difficult to drink a lot of water due to her gastric sleeve. Still having some bilateral back pain as well.

## 2020-01-08 NOTE — PROGRESS NOTES
"Subjective:   Samantha Baker is a 49 y.o. female here today for the following concerns:    Sore throat  Ongoing for 2 days with hoarse voice. No fevers, chills, body aches. Daughter was recently diagnosed with strep.     Acute cystitis without hematuria  Was treated for UTI 2 weeks ago with Cefdinir. Culture positive for E coli sensitive to cephalosporins. Still having some discomfort while urinating, unsure if she is dehydrated or not, difficult to drink a lot of water due to her gastric sleeve. Still having some bilateral back pain as well.        Current medicines (including changes today)  Current Outpatient Medications   Medication Sig Dispense Refill   • Cyanocobalamin (B-12) 3000 MCG SL Tab Place  under tongue every 7 days.       No current facility-administered medications for this visit.      She  has a past medical history of Acne rosacea (9/4/2011), Hemorrhoids, High cholesterol (09/2019), History of sleeve gastrectomy, History of tobacco use (9/4/2011), Hyperlipidemia (6/8/2011), and Metabolic syndrome (9/4/2011). She also has no past medical history of GERD (gastroesophageal reflux disease).    ROS  No chest pain, no shortness of breath, no abdominal pain  Positive ROS as per HPI.  All other systems reviewed and are negative.     Objective:     /64 (BP Location: Right arm, Patient Position: Sitting)   Pulse 92   Temp 36.6 °C (97.8 °F) (Temporal)   Ht 1.575 m (5' 2\")   Wt 57.2 kg (126 lb)   SpO2 98%  Body mass index is 23.05 kg/m².     Physical Exam:  Constitutional: Alert, no distress.  Skin: Warm, dry, good turgor, no rashes in visible areas.  Eye: Equal, round, conjunctiva clear, lids normal.  ENMT: Lips without lesions, good dentition  Neck: Trachea midline  Respiratory: Unlabored respiratory effort  Cardiovascular: No edema.  Abdomen: +CVA tenderness, L>R  Psych: Alert and oriented x3, normal affect and mood.      Assessment and Plan:   The following treatment plan was " discussed    1. Acute cystitis without hematuria  Unstable  POC with moderate blood  Check culture  - POCT Urinalysis  - URINE CULTURE(NEW); Future    2. Sore throat  Unstable  Likely viral  Strep negative, possibly too early to test  Advised returning to clinic if sore throat significantly worsens or she develops fevers, chills, body aches and we will do throat culture.   - POCT Rapid Strep A      Followup: Return if symptoms worsen or fail to improve.    I have placed the below orders and discussed them with an approved delegating provider. The MA is performing the below orders under the direction of Dr. Guillermo

## 2020-01-11 LAB
BACTERIA UR CULT: NORMAL
SIGNIFICANT IND 70042: NORMAL
SITE SITE: NORMAL
SOURCE SOURCE: NORMAL

## 2020-01-17 ENCOUNTER — PATIENT MESSAGE (OUTPATIENT)
Dept: MEDICAL GROUP | Facility: MEDICAL CENTER | Age: 50
End: 2020-01-17

## 2020-01-17 DIAGNOSIS — R31.0 GROSS HEMATURIA: ICD-10-CM

## 2020-01-17 DIAGNOSIS — R10.9 FLANK PAIN: ICD-10-CM

## 2020-01-17 NOTE — PATIENT COMMUNICATION
Patient was seen last week for persistent dysuria and flank pain.  She was treated for possible UTI 2 weeks prior in urgent care.  Urinalysis revealed persistent hematuria, urine culture was negative.  Patient reports today that she continues to have significant flank pain, left greater than right as well as gross hematuria.    CT renal colic ordered to evaluate for kidney stone.  We will call and notify patient.    DWAIN Najera.

## 2020-01-17 NOTE — TELEPHONE ENCOUNTER
Regarding: Test Result Question  Contact: [phone]  ----- Message from Bernard Hopkins Ass't sent at 1/17/2020  7:36 AM PST -----       ----- Message from Samantha Baker to CATHERINE Najera sent at 1/17/2020  5:56 AM -----   Marco Smith,     So the test is not showing any infection but I still have blood in my urine and back pain is getting worse. Difficult to use standing desk.  Left side is worse than right. I really enjoy my kidneys hope I am not doing long term damage to them by not addressing.  Any suggestions?

## 2020-01-17 NOTE — TELEPHONE ENCOUNTER
From: Samantha Baker  To: CATHERINE Najera  Sent: 1/17/2020 5:56 AM PST  Subject: Test Result Question    Marco Smith,     So the test is not showing any infection but I still have blood in my urine and back pain is getting worse. Difficult to use standing desk. Left side is worse than right. I really enjoy my kidneys hope I am not doing long term damage to them by not addressing. Any suggestions?

## 2020-01-31 ENCOUNTER — OFFICE VISIT (OUTPATIENT)
Dept: URGENT CARE | Facility: CLINIC | Age: 50
End: 2020-01-31
Payer: COMMERCIAL

## 2020-01-31 VITALS
DIASTOLIC BLOOD PRESSURE: 64 MMHG | TEMPERATURE: 99 F | RESPIRATION RATE: 14 BRPM | OXYGEN SATURATION: 96 % | HEIGHT: 62 IN | BODY MASS INDEX: 23 KG/M2 | HEART RATE: 62 BPM | SYSTOLIC BLOOD PRESSURE: 122 MMHG | WEIGHT: 125 LBS

## 2020-01-31 DIAGNOSIS — S61.210A LACERATION OF RIGHT INDEX FINGER WITHOUT FOREIGN BODY WITHOUT DAMAGE TO NAIL, INITIAL ENCOUNTER: ICD-10-CM

## 2020-01-31 PROCEDURE — 99213 OFFICE O/P EST LOW 20 MIN: CPT | Performed by: PHYSICIAN ASSISTANT

## 2020-01-31 RX ORDER — AMOXICILLIN AND CLAVULANATE POTASSIUM 875; 125 MG/1; MG/1
1 TABLET, FILM COATED ORAL 2 TIMES DAILY
Qty: 14 TAB | Refills: 0 | Status: SHIPPED | OUTPATIENT
Start: 2020-01-31 | End: 2020-02-07

## 2020-01-31 ASSESSMENT — ENCOUNTER SYMPTOMS
FEVER: 0
CHILLS: 0

## 2020-01-31 NOTE — PROGRESS NOTES
Subjective:   Samantha Baker is a 49 y.o. female who presents today with   Chief Complaint   Patient presents with   • Laceration     (R) index finger, knife cutting meat, did not clean out, TDap 4/2017        Laceration    The incident occurred 6 to 12 hours ago. The laceration is located on the right hand. The laceration is 1 cm in size. The laceration mechanism was a dirty knife. The pain is mild. The pain has been fluctuating since onset. She reports no foreign bodies present. Her tetanus status is UTD.     Patient was cutting tenderloin last night with a knife.  She did not initially clean the area.  She did place a bandage over the top of it.  This morning she noticed that the fifth finger was getting red and warm to the touch.  Her tetanus is up-to-date.  PMH:  has a past medical history of Acne rosacea (9/4/2011), Hemorrhoids, High cholesterol (09/2019), History of sleeve gastrectomy, History of tobacco use (9/4/2011), Hyperlipidemia (6/8/2011), and Metabolic syndrome (9/4/2011). She also has no past medical history of GERD (gastroesophageal reflux disease).  MEDS:   Current Outpatient Medications:   •  amoxicillin-clavulanate (AUGMENTIN) 875-125 MG Tab, Take 1 Tab by mouth 2 times a day for 7 days., Disp: 14 Tab, Rfl: 0  •  Cyanocobalamin (B-12) 3000 MCG SL Tab, Place  under tongue every 7 days., Disp: , Rfl:   ALLERGIES:   Allergies   Allergen Reactions   • Macrobid [Nitrofurantoin] Itching     SURGHX:   Past Surgical History:   Procedure Laterality Date   • PB EXCISE EXCESS SKIN TISSUE,ARM Bilateral 9/30/2019    Procedure: BRACHIOPLASTY;  Surgeon: Stan Carmona M.D.;  Location: SURGERY [facility];  Service: Plastics   • MAMMOPLASTY AUGMENTATION Bilateral 9/30/2019    Procedure: MAMMOPLASTY, AUGMENTATION;  Surgeon: Stan Carmona M.D.;  Location: [facility];  Service: Plastics   • BREAST IMPLANT REVISION Bilateral 9/30/2019    Procedure: INSERTION, IMPLANT, BREAST- SILICONE  "AND FULL BREAST LIFTS;  Surgeon: Stan Carmona M.D.;  Location: SURGERY [facility];  Service: Plastics   • GASTRIC BYPASS LAPAROSCOPIC  10/2018    Gastric sleeve   • CERVICAL DISK AND FUSION ANTERIOR  3/8/2016    Procedure: CERVICAL DISK AND FUSION ANTERIOR C6-7;  Surgeon: Jonas Ramos M.D.;  Location: SURGERY [facility];  Service:    • GM BY LAPAROSCOPY  11/27/08    Performed by LUCAS HILL at SURGERY [facility]     SOCHX:  reports that she quit smoking about 12 years ago. Her smoking use included cigarettes. She has a 10.00 pack-year smoking history. She has never used smokeless tobacco. She reports that she does not drink alcohol or use drugs.  FH: Reviewed with patient, not pertinent to this visit.       Review of Systems   Constitutional: Negative for chills and fever.   Skin:        Lac right index finger        Objective:   /64 (Patient Position: Sitting)   Pulse 62   Temp 37.2 °C (99 °F) (Temporal)   Resp 14   Ht 1.575 m (5' 2\")   Wt 56.7 kg (125 lb)   SpO2 96%   BMI 22.86 kg/m²   Physical Exam  Vitals signs and nursing note reviewed.   Constitutional:       General: She is not in acute distress.     Appearance: Normal appearance. She is well-developed and normal weight. She is not ill-appearing or toxic-appearing.   HENT:      Head: Normocephalic and atraumatic.      Right Ear: Hearing normal.      Left Ear: Hearing normal.   Cardiovascular:      Rate and Rhythm: Normal rate.      Heart sounds: Normal heart sounds.   Pulmonary:      Effort: Pulmonary effort is normal.   Musculoskeletal:      Comments: Patient has normal range of motion of the right index finger.  Patient has full strength against resistance of flexion and extension of the right index finger.   Skin:     General: Skin is warm and dry.             Comments: Small superficial approximately 1 cm laceration that is less than 1 mm in depth to the dorsum of the distal portion of the right index finger.  " Patient has tenderness to palpation to the surrounding area with erythema and swelling.  Erythema and swelling is limited to the distal portion of her index finger and does not spread proximally past the PIP.   Neurological:      Mental Status: She is alert.      Coordination: Coordination normal.   Psychiatric:         Mood and Affect: Mood normal.         Area was cleaned with copious amounts of Hibiclens.  No foreign body noted.  Assessment/Plan:   Assessment    1. Laceration of right index finger without foreign body without damage to nail, initial encounter  - amoxicillin-clavulanate (AUGMENTIN) 875-125 MG Tab; Take 1 Tab by mouth 2 times a day for 7 days.  Dispense: 14 Tab; Refill: 0  Given new presenting symptoms of potential infection decision was made to not close the area with Dermabond.  Encouraged patient to keep the area clean.  Antibiotic ointment and bandage placed today.  Discussed with patient wound care instructions.    She is agreeable to this plan.  Discussed potential red flag signs for her to return for reevaluation including spreading of the erythema or discharge from the wound site.    Please note that this dictation was created using voice recognition software. I have made every reasonable attempt to correct obvious errors, but I expect that there are errors of grammar and possibly content that I did not discover before finalizing the note.    Joey Lozada PA-C

## 2020-02-28 ENCOUNTER — OFFICE VISIT (OUTPATIENT)
Dept: MEDICAL GROUP | Facility: MEDICAL CENTER | Age: 50
End: 2020-02-28
Payer: COMMERCIAL

## 2020-02-28 ENCOUNTER — HOSPITAL ENCOUNTER (OUTPATIENT)
Facility: MEDICAL CENTER | Age: 50
End: 2020-02-28
Attending: NURSE PRACTITIONER
Payer: COMMERCIAL

## 2020-02-28 VITALS
WEIGHT: 127.4 LBS | OXYGEN SATURATION: 98 % | BODY MASS INDEX: 23.45 KG/M2 | TEMPERATURE: 99 F | SYSTOLIC BLOOD PRESSURE: 120 MMHG | DIASTOLIC BLOOD PRESSURE: 76 MMHG | HEIGHT: 62 IN | HEART RATE: 72 BPM

## 2020-02-28 DIAGNOSIS — N30.01 ACUTE CYSTITIS WITH HEMATURIA: ICD-10-CM

## 2020-02-28 DIAGNOSIS — N30.00 ACUTE CYSTITIS WITHOUT HEMATURIA: ICD-10-CM

## 2020-02-28 DIAGNOSIS — B00.1 HERPES LABIALIS: ICD-10-CM

## 2020-02-28 DIAGNOSIS — N39.0 RECURRENT UTI: ICD-10-CM

## 2020-02-28 DIAGNOSIS — R31.21 ASYMPTOMATIC MICROSCOPIC HEMATURIA: ICD-10-CM

## 2020-02-28 DIAGNOSIS — A60.00 HERPES SIMPLEX INFECTION OF GENITOURINARY SYSTEM: ICD-10-CM

## 2020-02-28 LAB
APPEARANCE UR: CLEAR
BILIRUB UR STRIP-MCNC: NEGATIVE MG/DL
COLOR UR AUTO: YELLOW
GLUCOSE UR STRIP.AUTO-MCNC: NEGATIVE MG/DL
KETONES UR STRIP.AUTO-MCNC: NEGATIVE MG/DL
LEUKOCYTE ESTERASE UR QL STRIP.AUTO: NORMAL
NITRITE UR QL STRIP.AUTO: POSITIVE
PH UR STRIP.AUTO: 6.5 [PH] (ref 5–8)
PROT UR QL STRIP: NEGATIVE MG/DL
RBC UR QL AUTO: NORMAL
SP GR UR STRIP.AUTO: >=1.03
UROBILINOGEN UR STRIP-MCNC: 0.2 MG/DL

## 2020-02-28 PROCEDURE — 87086 URINE CULTURE/COLONY COUNT: CPT

## 2020-02-28 PROCEDURE — 81002 URINALYSIS NONAUTO W/O SCOPE: CPT | Mod: QW | Performed by: NURSE PRACTITIONER

## 2020-02-28 PROCEDURE — 87077 CULTURE AEROBIC IDENTIFY: CPT

## 2020-02-28 PROCEDURE — 99214 OFFICE O/P EST MOD 30 MIN: CPT | Performed by: NURSE PRACTITIONER

## 2020-02-28 PROCEDURE — 87186 SC STD MICRODIL/AGAR DIL: CPT

## 2020-02-28 RX ORDER — SULFAMETHOXAZOLE AND TRIMETHOPRIM 800; 160 MG/1; MG/1
1 TABLET ORAL 2 TIMES DAILY
Qty: 6 TAB | Refills: 0 | Status: SHIPPED | OUTPATIENT
Start: 2020-02-28 | End: 2020-03-02

## 2020-02-28 RX ORDER — ACYCLOVIR 400 MG/1
400 TABLET ORAL 3 TIMES DAILY
Qty: 90 TAB | Refills: 1 | Status: SHIPPED | OUTPATIENT
Start: 2020-02-28 | End: 2021-12-22

## 2020-02-28 ASSESSMENT — FIBROSIS 4 INDEX: FIB4 SCORE: 0.83

## 2020-02-28 ASSESSMENT — PATIENT HEALTH QUESTIONNAIRE - PHQ9: CLINICAL INTERPRETATION OF PHQ2 SCORE: 0

## 2020-03-13 NOTE — PROGRESS NOTES
"Subjective:   Samantha Baker is a 49 y.o. female here today for dysuria:    Acute cystitis without hematuria  Patient has had frequent UTIs due to difficulty remaining well hydrated after her gastric sleeve as this limits her intake quite a bit. Patient reports having some dysuria and bilateral back pain which has been persistent. She was treated for UTI Dec 2019, retested January 2020 in which culture was negative and UA was positive for blood only.     No fever, chills, body aches.        Current medicines (including changes today)  Current Outpatient Medications   Medication Sig Dispense Refill   • acyclovir (ZOVIRAX) 400 MG tablet Take 1 Tab by mouth 3 times a day. For 5 days 90 Tab 1   • Cyanocobalamin (B-12) 3000 MCG SL Tab Place  under tongue every 7 days.       No current facility-administered medications for this visit.      She  has a past medical history of Acne rosacea (9/4/2011), Hemorrhoids, High cholesterol (09/2019), History of sleeve gastrectomy, History of tobacco use (9/4/2011), Hyperlipidemia (6/8/2011), and Metabolic syndrome (9/4/2011). She also has no past medical history of GERD (gastroesophageal reflux disease).    ROS   No chest pain, no shortness of breath, no abdominal pain  Positive ROS as per HPI.  All other systems reviewed and are negative.     Objective:     /76 (BP Location: Right arm, Patient Position: Sitting, BP Cuff Size: Adult)   Pulse 72   Temp 37.2 °C (99 °F) (Temporal)   Ht 1.575 m (5' 2\")   Wt 57.8 kg (127 lb 6.4 oz)   SpO2 98%  Body mass index is 23.3 kg/m².     Physical Exam:  Constitutional: Alert, no distress.  Skin: Warm, dry, good turgor, no rashes in visible areas.  Eye: Equal, round, conjunctiva clear, lids normal.  ENMT: Lips without lesions, good dentition  Neck: Trachea midline  Respiratory: Unlabored respiratory effort  Cardiovascular: No edema.  Abdomen: mild B/L CVA tenderness  Psych: Alert and oriented x3, normal affect and " mood.      Assessment and Plan:   The following treatment plan was discussed    1. Acute cystitis with hematuria  Unstable  Bactrim BID for 3 days  Check culture  - URINE CULTURE(NEW); Future  - sulfamethoxazole-trimethoprim (BACTRIM DS) 800-160 MG tablet; Take 1 Tab by mouth 2 times a day for 3 days.  Dispense: 6 Tab; Refill: 0    2. Herpes labialis  Stable  Refilled  - acyclovir (ZOVIRAX) 400 MG tablet; Take 1 Tab by mouth 3 times a day. For 5 days  Dispense: 90 Tab; Refill: 1    3. Herpes simplex infection of genitourinary system  Stable   Refilled  - acyclovir (ZOVIRAX) 400 MG tablet; Take 1 Tab by mouth 3 times a day. For 5 days  Dispense: 90 Tab; Refill: 1    4. Recurrent UTI  Unstable  Follow up with Urology for recurrent UTIs and hematuria.   - REFERRAL TO UROLOGY  - POCT Urinalysis    5. Asymptomatic microscopic hematuria  - REFERRAL TO UROLOGY      Followup: Return if symptoms worsen or fail to improve.    I have placed the below orders and discussed them with an approved delegating provider. The MA is performing the below orders under the direction of Dr. Guillermo

## 2020-03-13 NOTE — ASSESSMENT & PLAN NOTE
Patient has had frequent UTIs due to difficulty remaining well hydrated after her gastric sleeve as this limits her intake quite a bit. Patient reports having some dysuria and bilateral back pain which has been persistent. She was treated for UTI Dec 2019, retested January 2020 in which culture was negative and UA was positive for blood only.     No fever, chills, body aches.

## 2020-04-22 ENCOUNTER — HOSPITAL ENCOUNTER (OUTPATIENT)
Dept: LAB | Facility: MEDICAL CENTER | Age: 50
End: 2020-04-22
Attending: UROLOGY
Payer: COMMERCIAL

## 2020-04-22 LAB
ANION GAP SERPL CALC-SCNC: 14 MMOL/L (ref 7–16)
BUN SERPL-MCNC: 18 MG/DL (ref 8–22)
CALCIUM SERPL-MCNC: 9.3 MG/DL (ref 8.5–10.5)
CHLORIDE SERPL-SCNC: 103 MMOL/L (ref 96–112)
CO2 SERPL-SCNC: 24 MMOL/L (ref 20–33)
CREAT SERPL-MCNC: 0.54 MG/DL (ref 0.5–1.4)
GLUCOSE SERPL-MCNC: 101 MG/DL (ref 65–99)
POTASSIUM SERPL-SCNC: 3.9 MMOL/L (ref 3.6–5.5)
SODIUM SERPL-SCNC: 141 MMOL/L (ref 135–145)

## 2020-04-22 PROCEDURE — 80048 BASIC METABOLIC PNL TOTAL CA: CPT

## 2020-04-22 PROCEDURE — 36415 COLL VENOUS BLD VENIPUNCTURE: CPT

## 2020-04-22 PROCEDURE — 87086 URINE CULTURE/COLONY COUNT: CPT

## 2020-04-25 LAB
BACTERIA UR CULT: NORMAL
SIGNIFICANT IND 70042: NORMAL
SITE SITE: NORMAL
SOURCE SOURCE: NORMAL

## 2020-11-17 ENCOUNTER — TELEMEDICINE (OUTPATIENT)
Dept: MEDICAL GROUP | Facility: MEDICAL CENTER | Age: 50
End: 2020-11-17
Payer: COMMERCIAL

## 2020-11-17 ENCOUNTER — NURSE TRIAGE (OUTPATIENT)
Dept: HEALTH INFORMATION MANAGEMENT | Facility: OTHER | Age: 50
End: 2020-11-17

## 2020-11-17 VITALS
HEIGHT: 62 IN | SYSTOLIC BLOOD PRESSURE: 110 MMHG | DIASTOLIC BLOOD PRESSURE: 78 MMHG | TEMPERATURE: 97.8 F | WEIGHT: 127 LBS | BODY MASS INDEX: 23.37 KG/M2

## 2020-11-17 DIAGNOSIS — J30.1 SEASONAL ALLERGIC RHINITIS DUE TO POLLEN: ICD-10-CM

## 2020-11-17 PROCEDURE — 99213 OFFICE O/P EST LOW 20 MIN: CPT | Mod: 95,CR | Performed by: NURSE PRACTITIONER

## 2020-11-17 NOTE — PROGRESS NOTES
Virtual Visit: Established Patient   This visit was conducted via Zoom using secure and encrypted videoconferencing technology. The patient was in a private location in the state of [place].    The patient's identity was confirmed and verbal consent was obtained for this virtual visit.    Subjective:   CC: sinusitis    Samantha Baker is a 50 y.o. female presenting for evaluation and management of: sinusitis.  Several weeks ago she started getting headaches and sore throat.  She was using her mask w/o washing.  Then she started having clear nasal dg.  Mild sore throat..  No fever, chills or sweating.  Then last friday she got a burning sensation in her throat.  seh thought maybe allergies.  Was better over the w/e.  Then on monday pm the burning in her throat returned.  She also has a dry cough.  She is having pressure in frontal sinus.  She is using humidifier.  There are no sx of infection.  Secretions still clear.  Just the burning in her throat.        ROS   Review of Systems   Constitutional: Negative.  Negative for fever, chills, weight loss, malaise/fatigue and diaphoresis.   HENT: Negative.  Negative for hearing loss, ear pain, nosebleeds, neck pain, tinnitus and ear discharge.    Respiratory: Negative.  Negative for hemoptysis, sputum production, shortness of breath, wheezing and stridor.    Cardiovascular: Negative.  Negative for chest pain, palpitations, orthopnea, claudication, leg swelling and PND.   Gastrointestinal: denies nausea, vomiting, diarrhea, constipation, heartburn, melena or hematochezia.  Genitourinary: Denies dysuria, hematuria, urinary incontinence, frequency or urgency.    Musculoskeletal: Negative.  Negative for myalgias and back pain.   Neurological: Negative.  Negative for dizziness, tingling, tremors, weakness and headaches.   Psych:  Denies depression, anxiety or insomnia.  All other systems reviewed and are negative.      Allergies   Allergen Reactions   • Macrobid  [Nitrofurantoin] Itching       Current medicines (including changes today)  Current Outpatient Medications   Medication Sig Dispense Refill   • acyclovir (ZOVIRAX) 400 MG tablet Take 1 Tab by mouth 3 times a day. For 5 days 90 Tab 1   • Cyanocobalamin (B-12) 3000 MCG SL Tab Place  under tongue every 7 days.       No current facility-administered medications for this visit.        Patient Active Problem List    Diagnosis Date Noted   • Sore throat 01/08/2020   • Acute cystitis without hematuria 01/08/2020   • Pain of left lower extremity 10/18/2019   • S/P breast augmentation 10/18/2019   • S/P laparoscopic sleeve gastrectomy 05/10/2019   • Recurrent UTI 05/10/2019   • Vaginal candidiasis 04/22/2019   • Vaginal itching 04/10/2019   • Thickened endometrium 10/04/2018   • HPV in female 10/04/2018   • Vitamin D deficiency 07/30/2018   • Encounter for monitoring statin therapy 07/30/2018   • Vaginal odor 07/30/2018   • Chronic fatigue 06/29/2018   • Obesity (BMI 30-39.9) 06/29/2018   • Symptoms referable to hip joint 03/05/2018   • Right ear pain 03/05/2018   • Herpes simplex infection of genitourinary system 10/17/2016   • Seasonal allergic rhinitis due to pollen 10/17/2016   • Herpes labialis 03/13/2014   • History of tobacco use 09/04/2011   • Hyperlipidemia 06/08/2011       Family History   Problem Relation Age of Onset   • Heart Attack Maternal Grandfather         d. 40s   • Cancer Maternal Aunt         Lung   • No Known Problems Mother    • No Known Problems Father    • No Known Problems Sister    • No Known Problems Brother        She  has a past medical history of Acne rosacea (9/4/2011), Hemorrhoids, High cholesterol (09/2019), History of sleeve gastrectomy, History of tobacco use (9/4/2011), Hyperlipidemia (6/8/2011), and Metabolic syndrome (9/4/2011). She also has no past medical history of GERD (gastroesophageal reflux disease).  She  has a past surgical history that includes orly by laparoscopy (11/27/08);  "cervical disk and fusion anterior (3/8/2016); gastric bypass laparoscopic (10/2018); pr excise excess skin tissue,arm (Bilateral, 9/30/2019); mammoplasty augmentation (Bilateral, 9/30/2019); and breast implant revision (Bilateral, 9/30/2019).       Objective:   /78 (BP Location: Left arm)   Temp 36.6 °C (97.8 °F) (Temporal)   Ht 1.575 m (5' 2\")   Wt 57.6 kg (127 lb)   BMI 23.23 kg/m²     Physical Exam:  Constitutional: Alert, no distress, well-groomed.  Skin: No rashes in visible areas.  Eye: Round. Conjunctiva clear, lids normal. No icterus.   ENMT: Lips pink without lesions, good dentition, moist mucous membranes. Phonation normal.  Neck: No masses, no thyromegaly. Moves freely without pain.  Respiratory: Unlabored respiratory effort, no cough or audible wheeze  Psych: Alert and oriented x3, normal affect and mood.       Assessment and Plan:   The following treatment plan was discussed:   1. Seasonal allergic rhinitis due to pollen      use zyrtec x 3 weeks or til sx resolve.  add flonase if congestion+.  if not better email for singulair.  is + sx infection email for antibx.             Follow-up: Return if symptoms worsen or fail to improve.           "

## 2020-11-17 NOTE — TELEPHONE ENCOUNTER
FYI on and off symptoms  Headache one week ago  Dry cough. Sore throat on off times 2 weeks..last PM burning sore throat. No fever.  No Direct Covid exposure . Virtual Visit  vs walk in to Urgent Care (per patient desire to be cleaerd for work)     Reason for Disposition  • Cough present > 3 weeks    Additional Information  • Negative: SEVERE difficulty breathing (e.g., struggling for each breath, speaks in single words)  • Negative: Difficult to awaken or acting confused (e.g., disoriented, slurred speech)  • Negative: Bluish (or gray) lips or face now  • Negative: Shock suspected (e.g., cold/pale/clammy skin, too weak to stand, low BP, rapid pulse)  • Negative: Sounds like a life-threatening emergency to the triager  • Negative: [1] COVID-19 suspected (e.g., cough, fever, shortness of breath) AND [2] public health department recommends testing  • Negative: [1] COVID-19 exposure AND [2] no symptoms  • Negative: COVID-19 and Breastfeeding, questions about  • Negative: SEVERE or constant chest pain (Exception: mild central chest pain, present only when coughing)  • Negative: MODERATE difficulty breathing (e.g., speaks in phrases, SOB even at rest, pulse 100-120)  • Negative: MILD difficulty breathing (e.g., minimal/no SOB at rest, SOB with walking, pulse <100)  • Negative: Chest pain  • Negative: Patient sounds very sick or weak to the triager  • Negative: Fever > 103 F (39.4 C)  • Negative: [1] Fever > 101 F (38.3 C) AND [2] age > 60  • Negative: [1] Fever > 100.0 F (37.8 C) AND [2] bedridden (e.g., nursing home patient, CVA, chronic illness, recovering from surgery)  • Negative: HIGH RISK patient (e.g., age > 64 years, diabetes, heart or lung disease, weak immune system)  • Negative: Fever present > 3 days (72 hours)  • Negative: [1] Fever returns after gone for over 24 hours AND [2] symptoms worse or not improved  • Negative: [1] Continuous (nonstop) coughing interferes with work or school AND [2] no improvement  "using cough treatment per protocol    Answer Assessment - Initial Assessment Questions  1. COVID-19 DIAGNOSIS: \"Who made your Coronavirus (COVID-19) diagnosis?\" \"Was it confirmed by a positive lab test?\" If not diagnosed by a HCP, ask \"Are there lots of cases (community spread) where you live?\" (See public health department website, if unsure)    * MAJOR community spread: high number of cases; numbers of cases are increasing; many people hospitalized.    * MINOR community spread: low number of cases; not increasing; few or no people hospitalized     Major  2. ONSET: \"When did the COVID-19 symptoms start?\"       2  weeks ? Headache sore throat off and on with head cold   3. WORST SYMPTOM: \"What is your worst symptom?\" (e.g., cough, fever, shortness of breath, muscle aches)      Burning sore throat evening of 11/16/20  4. COUGH: \"How bad is the cough?\"        Dry Cough  5. FEVER: \"Do you have a fever?\" If so, ask: \"What is your temperature, how was it measured, and when did it start?\"      None  6. RESPIRATORY STATUS: \"Describe your breathing?\" (e.g., shortness of breath, wheezing, unable to speak)       None  7. BETTER-SAME-WORSE: \"Are you getting better, staying the same or getting worse compared to yesterday?\"  If getting worse, ask, \"In what way?\"       burning sore throat   8. HIGH RISK DISEASE: \"Do you have any chronic medical problems?\" (e.g., asthma, heart or lung disease, weak immune system, etc.)      None  9. PREGNANCY: \"Is there any chance you are pregnant?\" \"When was your last menstrual period?\"     No  10. OTHER SYMPTOMS: \"Do you have any other symptoms?\"  (e.g., runny nose, headache, sore throat, loss of smell)        Headache last week..feeling of  burning sore throat. Never had seasonal allergies    Protocols used: CORONAVIRUS (COVID-19) DIAGNOSED OR [id number]      "

## 2020-12-20 ENCOUNTER — TELEMEDICINE (OUTPATIENT)
Dept: TELEHEALTH | Facility: TELEMEDICINE | Age: 50
End: 2020-12-20
Payer: COMMERCIAL

## 2020-12-20 DIAGNOSIS — Z23 NEED FOR VACCINATION: ICD-10-CM

## 2020-12-20 DIAGNOSIS — R30.0 DYSURIA: ICD-10-CM

## 2020-12-20 PROCEDURE — 99214 OFFICE O/P EST MOD 30 MIN: CPT | Mod: 95,CR | Performed by: PHYSICIAN ASSISTANT

## 2020-12-20 RX ORDER — CEFDINIR 300 MG/1
300 CAPSULE ORAL 2 TIMES DAILY
Qty: 10 CAP | Refills: 0 | Status: SHIPPED | OUTPATIENT
Start: 2020-12-20 | End: 2020-12-25

## 2020-12-20 ASSESSMENT — ENCOUNTER SYMPTOMS
COUGH: 0
FLANK PAIN: 0
FEVER: 0
HEADACHES: 0
EYE REDNESS: 0
SHORTNESS OF BREATH: 0
VOMITING: 0
EYE DISCHARGE: 0
NAUSEA: 0
SORE THROAT: 0

## 2020-12-20 NOTE — PROGRESS NOTES
Telemedicine: Established Patient   This evaluation was conducted via Zoom using secure and encrypted videoconferencing technology. The patient was in a private location in the [facility].    The patient's identity was confirmed and verbal consent was obtained for this virtual visit.    Subjective:   CC: possible UTI with dysuria, frequency, and urgency x 2 weeks     Samantha Baker is a 50 y.o. female presenting for evaluation and management of:    The patient presents via virtual visit secondary to a possible UTI x2 weeks.  The patient states she is currently experiencing dysuria, frequency, and urgency.  The patient states that her urine is dark and cloudy.  She reports no associated fever.  No nausea/vomiting.  No abdominal pain.  No flank pain.  The patient has not taken any medications for her current symptoms.  The patient has been increasing her fluid intake.    PMH:  has a past medical history of Acne rosacea (9/4/2011), Hemorrhoids, High cholesterol (09/2019), History of sleeve gastrectomy, History of tobacco use (9/4/2011), Hyperlipidemia (6/8/2011), and Metabolic syndrome (9/4/2011). She also has no past medical history of GERD (gastroesophageal reflux disease).  MEDS:   Current Outpatient Medications:   •  acyclovir (ZOVIRAX) 400 MG tablet, Take 1 Tab by mouth 3 times a day. For 5 days, Disp: 90 Tab, Rfl: 1  •  Cyanocobalamin (B-12) 3000 MCG SL Tab, Place  under tongue every 7 days., Disp: , Rfl:   ALLERGIES:   Allergies   Allergen Reactions   • Macrobid [Nitrofurantoin] Itching     SURGHX:   Past Surgical History:   Procedure Laterality Date   • PB EXCISE EXCESS SKIN TISSUE,ARM Bilateral 9/30/2019    Procedure: BRACHIOPLASTY;  Surgeon: Stan Carmona M.D.;  Location: [facility];  Service: Plastics   • MAMMOPLASTY AUGMENTATION Bilateral 9/30/2019    Procedure: MAMMOPLASTY, AUGMENTATION;  Surgeon: Stan Carmona M.D.;  Location: [facility];  Service: Plastics   •  BREAST IMPLANT REVISION Bilateral 9/30/2019    Procedure: INSERTION, IMPLANT, BREAST- SILICONE AND FULL BREAST LIFTS;  Surgeon: Stan Carmona M.D.;  Location: SURGERY [facility];  Service: Plastics   • GASTRIC BYPASS LAPAROSCOPIC  10/2018    Gastric sleeve   • CERVICAL DISK AND FUSION ANTERIOR  3/8/2016    Procedure: CERVICAL DISK AND FUSION ANTERIOR C6-7;  Surgeon: Jonas Ramos M.D.;  Location: SURGERY [facility];  Service:    • GM BY LAPAROSCOPY  11/27/08    Performed by LUCAS HILL at SURGERY [facility]     SOCHX:  reports that she quit smoking about 12 years ago. Her smoking use included cigarettes. She has a 10.00 pack-year smoking history. She has never used smokeless tobacco. She reports that she does not drink alcohol or use drugs.  FH: Family history was reviewed, no pertinent findings to report      Review of Systems   Constitutional: Negative for fever.   HENT: Negative for congestion, ear pain and sore throat.    Eyes: Negative for discharge and redness.   Respiratory: Negative for cough and shortness of breath.    Cardiovascular: Negative for chest pain and leg swelling.   Gastrointestinal: Negative for nausea and vomiting.   Genitourinary: Positive for dysuria, frequency and urgency. Negative for flank pain and hematuria.   Musculoskeletal: Negative for joint pain.   Skin: Negative for rash.   Neurological: Negative for headaches.              Objective:     Vitals obtained by patient: none as the patient was seen via virtual visit.     Physical Exam:  Constitutional: Alert, no distress, well-groomed.  Skin: No rashes in visible areas.  Eye: Round. Conjunctiva clear, lids normal. No icterus.   ENMT: Lips pink without lesions, good dentition, moist mucous membranes. Phonation normal.  Neck: No masses, no thyromegaly. Moves freely without pain.  CV: Pulse as reported by patient  Respiratory: Unlabored respiratory effort, no cough or audible wheeze  Psych: Alert and  oriented x3, normal affect and mood.       Assessment and Plan:   The following treatment plan was discussed:     1. Dysuria  - cefdinir (OMNICEF) 300 MG Cap; Take 1 Cap by mouth 2 times a day for 5 days.  Dispense: 10 Cap; Refill: 0    The patient's presenting symptoms and virtual physical exam findings are consistent with dysuria.  Advised patient of the limitations of evaluating for possible UTI via virtual visits.  The patient verbalized understanding.  Will prescribe the patient cefdinir for presumed UTI.  Advised the patient she would need to be seen and evaluated in clinic if her symptoms do not improve with the prescribed antibiotic.  Patient verbalized understanding.  Recommend OTC medications and supportive care for symptomatic management.  Recommend patient follow-up with her PCP.  Discussed return precautions with patient, and she verbalized understanding.    Differential diagnoses, supportive care, and indications for immediate follow-up discussed with patient.   Instructed to return to clinic or nearest emergency department for any change in condition, further concerns, or worsening of symptoms.    OTC Tylenol or Motrin for fever/discomfort.  Drink plenty of fluids  Follow-up with PCP  Return to clinic or go to the ED if symptoms worsen or fail to improve, or if the patient should develop worsening/increasing urinary symptoms, hematuria, flank pain, abdominal pain, nausea/vomiting, fever/chills, and/or any concerning symptoms.    Discussed plan with the patient, and she agrees to the above.     Please note that this dictation was created using voice recognition software. I have made every reasonable attempt to correct obvious errors, but I expect that there may be errors of grammar and possibly content that I did not discover before finalizing the note.       Follow-up: No follow-ups on file.

## 2021-09-14 ENCOUNTER — TELEPHONE (OUTPATIENT)
Dept: OCCUPATIONAL MEDICINE | Facility: CLINIC | Age: 51
End: 2021-09-14

## 2021-09-14 NOTE — TELEPHONE ENCOUNTER
Phone Number Called: [phone]  Call outcome: Spoke to patient regarding message below.    Message: Patient called the employee screening line stating she was exposed to COVID and is currently not having any symptoms. I informed her she did not need to be excluded from work or tested if she has no symptoms. She was also informed to give us a call at [phone] if she develops any over time. Patient verbalized understanding.

## 2021-12-21 ENCOUNTER — HOSPITAL ENCOUNTER (OUTPATIENT)
Facility: MEDICAL CENTER | Age: 51
End: 2021-12-21
Attending: STUDENT IN AN ORGANIZED HEALTH CARE EDUCATION/TRAINING PROGRAM
Payer: COMMERCIAL

## 2021-12-21 ENCOUNTER — OFFICE VISIT (OUTPATIENT)
Dept: URGENT CARE | Facility: CLINIC | Age: 51
End: 2021-12-21
Payer: COMMERCIAL

## 2021-12-21 VITALS
RESPIRATION RATE: 16 BRPM | SYSTOLIC BLOOD PRESSURE: 98 MMHG | OXYGEN SATURATION: 98 % | HEIGHT: 62 IN | BODY MASS INDEX: 24.33 KG/M2 | DIASTOLIC BLOOD PRESSURE: 72 MMHG | WEIGHT: 132.2 LBS | TEMPERATURE: 98.4 F | HEART RATE: 72 BPM

## 2021-12-21 DIAGNOSIS — R30.0 DYSURIA: ICD-10-CM

## 2021-12-21 DIAGNOSIS — R10.9 FLANK PAIN: ICD-10-CM

## 2021-12-21 DIAGNOSIS — R31.29 MICROSCOPIC HEMATURIA: ICD-10-CM

## 2021-12-21 DIAGNOSIS — R10.30 LOWER ABDOMINAL PAIN: ICD-10-CM

## 2021-12-21 LAB
APPEARANCE UR: CLEAR
BILIRUB UR STRIP-MCNC: NEGATIVE MG/DL
COLOR UR AUTO: YELLOW
GLUCOSE UR STRIP.AUTO-MCNC: NEGATIVE MG/DL
KETONES UR STRIP.AUTO-MCNC: NEGATIVE MG/DL
LEUKOCYTE ESTERASE UR QL STRIP.AUTO: NEGATIVE
NITRITE UR QL STRIP.AUTO: NEGATIVE
PH UR STRIP.AUTO: 7 [PH] (ref 5–8)
PROT UR QL STRIP: NEGATIVE MG/DL
RBC UR QL AUTO: NORMAL
SP GR UR STRIP.AUTO: 1.02
UROBILINOGEN UR STRIP-MCNC: 0.2 MG/DL

## 2021-12-21 PROCEDURE — 81002 URINALYSIS NONAUTO W/O SCOPE: CPT | Performed by: STUDENT IN AN ORGANIZED HEALTH CARE EDUCATION/TRAINING PROGRAM

## 2021-12-21 PROCEDURE — 87086 URINE CULTURE/COLONY COUNT: CPT

## 2021-12-21 PROCEDURE — 87186 SC STD MICRODIL/AGAR DIL: CPT

## 2021-12-21 PROCEDURE — 87077 CULTURE AEROBIC IDENTIFY: CPT

## 2021-12-21 PROCEDURE — 99214 OFFICE O/P EST MOD 30 MIN: CPT | Performed by: STUDENT IN AN ORGANIZED HEALTH CARE EDUCATION/TRAINING PROGRAM

## 2021-12-21 ASSESSMENT — ENCOUNTER SYMPTOMS
FLANK PAIN: 1
NAUSEA: 0
FEVER: 0
PALPITATIONS: 0
ABDOMINAL PAIN: 0
COUGH: 0
SHORTNESS OF BREATH: 0
HEADACHES: 0
EYE PAIN: 0
VOMITING: 0
PHOTOPHOBIA: 0
CHILLS: 0

## 2021-12-21 NOTE — PROGRESS NOTES
"Subjective     Samantha Baker is a 51 y.o. female who presents with UTI (2 weeks-month, azo has helped temporarily, pain, burning, frequent urgency) and Flank Pain (keeps hydrated 4 out of 10 todya, kidney area)            HPI    Patient reported 2months of dysurian, intermittent, mild  flank pain and lower abdominal discomforts. She also reported intermitted hematuria. She has been tried Azo, but no relief. She denies urgency or frequency. Hx of Gastric surgey  No f/c/n/v, sob, cp.      Review of Systems   Constitutional: Negative for chills and fever.   HENT: Negative for ear pain.    Eyes: Negative for photophobia and pain.   Respiratory: Negative for cough and shortness of breath.    Cardiovascular: Negative for chest pain and palpitations.   Gastrointestinal: Negative for abdominal pain, nausea and vomiting.   Genitourinary: Positive for dysuria and flank pain. Negative for hematuria.   Skin: Negative for itching.   Neurological: Negative for headaches.              Objective     BP (!) 98/72 (BP Location: Right arm, Patient Position: Sitting, BP Cuff Size: Adult)   Pulse 72   Temp 36.9 °C (98.4 °F) (Temporal)   Resp 16   Ht 1.575 m (5' 2\")   Wt 60 kg (132 lb 3.2 oz)   SpO2 98%   BMI 24.18 kg/m²      Physical Exam  Constitutional:       Appearance: Normal appearance.   HENT:      Head: Normocephalic.      Nose: Nose normal.      Mouth/Throat:      Mouth: Mucous membranes are moist.   Eyes:      Extraocular Movements: Extraocular movements intact.   Cardiovascular:      Rate and Rhythm: Normal rate and regular rhythm.      Pulses: Normal pulses.   Pulmonary:      Effort: Pulmonary effort is normal.   Abdominal:      Palpations: Abdomen is soft.      Tenderness: There is abdominal tenderness. There is right CVA tenderness and left CVA tenderness.   Musculoskeletal:         General: Normal range of motion.      Cervical back: Normal range of motion.   Skin:     General: Skin is warm.   Neurological: "      Mental Status: She is alert.   Psychiatric:         Mood and Affect: Mood normal.                             Assessment & Plan        1. Dysuria, intermittent    - POCT Urinalysis: negative for UTI  - URINE CULTURE(NEW); Future     UA negative for UTI. Positive for small blood.   Patient denies gross hematuria. No cystoscopy was done. She said she was supposed to f/u with urologist.  Recommended her to f/u urology    No previous CT abd or ultrasound in the chart    2. Microscopic hematuria    UA negative for UTI. Positive for small blood.   Patient denies gross hematuria. No cystoscopy was done in the past. She said she was supposed to f/u with urologist.   No previous CT abd or ultrasound in the chart.   Order CT abd to r/o kidney stones or underlying malignacy     Recommended her to f/u urology        3. Flank pain    Order CT abd to r/o kidney stones or underlying malignacy    4. Lower abdominal pain    - CT-ABDOMEN-PELVIS W/O; Future

## 2021-12-23 DIAGNOSIS — R31.9 URINARY TRACT INFECTION WITH HEMATURIA, SITE UNSPECIFIED: ICD-10-CM

## 2021-12-23 DIAGNOSIS — N39.0 URINARY TRACT INFECTION WITH HEMATURIA, SITE UNSPECIFIED: ICD-10-CM

## 2021-12-23 RX ORDER — CIPROFLOXACIN 500 MG/1
500 TABLET, FILM COATED ORAL 2 TIMES DAILY
Qty: 10 TABLET | Refills: 0 | Status: SHIPPED | OUTPATIENT
Start: 2021-12-23 | End: 2021-12-28

## 2021-12-28 ENCOUNTER — APPOINTMENT (OUTPATIENT)
Dept: RADIOLOGY | Facility: MEDICAL CENTER | Age: 51
End: 2021-12-28
Attending: STUDENT IN AN ORGANIZED HEALTH CARE EDUCATION/TRAINING PROGRAM
Payer: COMMERCIAL

## 2023-08-24 ENCOUNTER — PATIENT MESSAGE (OUTPATIENT)
Dept: HEALTH INFORMATION MANAGEMENT | Facility: OTHER | Age: 53
End: 2023-08-24

## 2024-05-16 ENCOUNTER — OFFICE VISIT (OUTPATIENT)
Dept: URGENT CARE | Facility: CLINIC | Age: 54
End: 2024-05-16
Payer: COMMERCIAL

## 2024-05-16 VITALS
BODY MASS INDEX: 24.84 KG/M2 | SYSTOLIC BLOOD PRESSURE: 102 MMHG | WEIGHT: 135 LBS | HEIGHT: 62 IN | HEART RATE: 62 BPM | DIASTOLIC BLOOD PRESSURE: 64 MMHG | RESPIRATION RATE: 16 BRPM | TEMPERATURE: 97.1 F | OXYGEN SATURATION: 96 %

## 2024-05-16 DIAGNOSIS — M62.838 MUSCLE SPASM: ICD-10-CM

## 2024-05-16 DIAGNOSIS — M53.3 PAIN OF LEFT SACROILIAC JOINT: ICD-10-CM

## 2024-05-16 PROCEDURE — 3078F DIAST BP <80 MM HG: CPT | Performed by: STUDENT IN AN ORGANIZED HEALTH CARE EDUCATION/TRAINING PROGRAM

## 2024-05-16 PROCEDURE — 99214 OFFICE O/P EST MOD 30 MIN: CPT | Performed by: STUDENT IN AN ORGANIZED HEALTH CARE EDUCATION/TRAINING PROGRAM

## 2024-05-16 PROCEDURE — 3074F SYST BP LT 130 MM HG: CPT | Performed by: STUDENT IN AN ORGANIZED HEALTH CARE EDUCATION/TRAINING PROGRAM

## 2024-05-16 RX ORDER — METHOCARBAMOL 500 MG/1
500 TABLET, FILM COATED ORAL 3 TIMES DAILY PRN
Qty: 15 TABLET | Refills: 0 | Status: SHIPPED | OUTPATIENT
Start: 2024-05-16 | End: 2024-05-21

## 2024-05-16 RX ORDER — PREDNISONE 20 MG/1
20 TABLET ORAL DAILY
Qty: 5 TABLET | Refills: 0 | Status: SHIPPED | OUTPATIENT
Start: 2024-05-16 | End: 2024-05-21
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
## 2024-05-31 ENCOUNTER — TELEPHONE (OUTPATIENT)
Dept: MEDICAL GROUP | Facility: MEDICAL CENTER | Age: 54
End: 2024-05-31
Payer: COMMERCIAL

## 2024-05-31 DIAGNOSIS — M53.3 PAIN OF LEFT SACROILIAC JOINT: ICD-10-CM

## 2024-05-31 DIAGNOSIS — M62.838 SPASM OF MUSCLE: ICD-10-CM

## 2024-08-01 ENCOUNTER — APPOINTMENT (OUTPATIENT)
Dept: PHYSICAL THERAPY | Facility: MEDICAL CENTER | Age: 54
End: 2024-08-01
Attending: STUDENT IN AN ORGANIZED HEALTH CARE EDUCATION/TRAINING PROGRAM
Payer: COMMERCIAL

## 2024-08-06 ENCOUNTER — APPOINTMENT (OUTPATIENT)
Dept: PHYSICAL THERAPY | Facility: MEDICAL CENTER | Age: 54
End: 2024-08-06
Attending: STUDENT IN AN ORGANIZED HEALTH CARE EDUCATION/TRAINING PROGRAM
Payer: COMMERCIAL

## 2024-08-08 ENCOUNTER — APPOINTMENT (OUTPATIENT)
Dept: PHYSICAL THERAPY | Facility: MEDICAL CENTER | Age: 54
End: 2024-08-08
Attending: STUDENT IN AN ORGANIZED HEALTH CARE EDUCATION/TRAINING PROGRAM
Payer: COMMERCIAL

## 2024-08-13 ENCOUNTER — APPOINTMENT (OUTPATIENT)
Dept: PHYSICAL THERAPY | Facility: MEDICAL CENTER | Age: 54
End: 2024-08-13
Attending: STUDENT IN AN ORGANIZED HEALTH CARE EDUCATION/TRAINING PROGRAM
Payer: COMMERCIAL

## 2024-08-15 ENCOUNTER — APPOINTMENT (OUTPATIENT)
Dept: PHYSICAL THERAPY | Facility: MEDICAL CENTER | Age: 54
End: 2024-08-15
Attending: STUDENT IN AN ORGANIZED HEALTH CARE EDUCATION/TRAINING PROGRAM
Payer: COMMERCIAL

## 2024-08-20 ENCOUNTER — APPOINTMENT (OUTPATIENT)
Dept: PHYSICAL THERAPY | Facility: MEDICAL CENTER | Age: 54
End: 2024-08-20
Attending: STUDENT IN AN ORGANIZED HEALTH CARE EDUCATION/TRAINING PROGRAM
Payer: COMMERCIAL

## 2024-08-22 ENCOUNTER — APPOINTMENT (OUTPATIENT)
Dept: PHYSICAL THERAPY | Facility: MEDICAL CENTER | Age: 54
End: 2024-08-22
Attending: STUDENT IN AN ORGANIZED HEALTH CARE EDUCATION/TRAINING PROGRAM
Payer: COMMERCIAL

## 2024-08-27 ENCOUNTER — APPOINTMENT (OUTPATIENT)
Dept: PHYSICAL THERAPY | Facility: MEDICAL CENTER | Age: 54
End: 2024-08-27
Payer: COMMERCIAL

## 2024-08-29 ENCOUNTER — APPOINTMENT (OUTPATIENT)
Dept: PHYSICAL THERAPY | Facility: MEDICAL CENTER | Age: 54
End: 2024-08-29
Payer: COMMERCIAL

## (undated) DEVICE — PACK MINOR BASIN - (2EA/CA)

## (undated) DEVICE — BANDAGE ELASTIC 4 IN X 5 YDS - LATEX FREE(10/BX 5BX/CA)

## (undated) DEVICE — PACK BREAST SM OR - (1EA/CA)

## (undated) DEVICE — KIT ANESTHESIA W/CIRCUIT & 3/LT BAG W/FILTER (20EA/CA)

## (undated) DEVICE — WATER IRRIGATION STERILE 1000ML (12EA/CA)

## (undated) DEVICE — GLOVE BIOGEL SZ 7.5 SURGICAL PF LTX - (50PR/BX 4BX/CA)

## (undated) DEVICE — GOWN WARMING STANDARD FLEX - (30/CA)

## (undated) DEVICE — SUTURE 4-0 PROLENE PS-2 18 (36PK/BX)"

## (undated) DEVICE — SENSOR SPO2 NEO LNCS ADHESIVE (20/BX) SEE USER NOTES

## (undated) DEVICE — BANDAGE ELASTIC DOUBLE 6X10 - (6EA/BX)"

## (undated) DEVICE — SUTURE GENERAL

## (undated) DEVICE — SUTURE V-LOCK 90 ABSORBABLE 3.0 CL23 ON A P14 NEEDLE (12EA/CA)

## (undated) DEVICE — KIT ROOM DECONTAMINATION

## (undated) DEVICE — DRAPE STRLE REG TOWEL 18X24 - (10/BX 4BX/CA)"

## (undated) DEVICE — GLOVE, LITE (PAIR)

## (undated) DEVICE — SODIUM CHL IRRIGATION 0.9% 1000ML (12EA/CA)

## (undated) DEVICE — GAUZE FLUFF STERILE 2-PLY 36 X 36 (100EA/CA)

## (undated) DEVICE — TUBE CONNECTING SUCTION - CLEAR PLASTIC STERILE 72 IN (50EA/CA)

## (undated) DEVICE — DRESSING XEROFORM 1X8 - (50/BX 4BX/CA)

## (undated) DEVICE — SUTURE 3-0 MONOCRYL PLUS PS-1 - 27 INCH (36/BX)

## (undated) DEVICE — BLADE SURGICAL #15 - (50/BX 3BX/CA)

## (undated) DEVICE — HUMID-VENT HEAT AND MOISTURE EXCHANGE- (50/BX)

## (undated) DEVICE — SYRINGE 10 ML CONTROL LL (25EA/BX 4BX/CA)

## (undated) DEVICE — CLOSURE SKIN STRIP 1/2 X 4 IN - (STERI STRIP) (50/BX 4BX/CA)

## (undated) DEVICE — ELECTRODE DUAL RETURN W/ CORD - (50/PK)

## (undated) DEVICE — ELECTRODE 850 FOAM ADHESIVE - HYDROGEL RADIOTRNSPRNT (50/PK)

## (undated) DEVICE — BAG, SPONGE COUNT 50600

## (undated) DEVICE — NEPTUNE 4 PORT MANIFOLD - (20/PK)

## (undated) DEVICE — CHLORAPREP 26 ML APPLICATOR - ORANGE TINT(25/CA)

## (undated) DEVICE — MASK ANESTHESIA ADULT  - (100/CA)

## (undated) DEVICE — STOCKINET TUBULAR 4IN STERILE - 4 X 36YDS (25/CA)

## (undated) DEVICE — HEAD HOLDER JUNIOR/ADULT

## (undated) DEVICE — DRAPE LARGE 3 QUARTER - (20/CA)

## (undated) DEVICE — SUTURE 4-0 MONOCRYL PLUS PS-2 - 27 INCH (36/BX)

## (undated) DEVICE — SPONGE GAUZE STER 4X4 8-PL - (2/PK 50PK/BX 12BX/CS)

## (undated) DEVICE — CANISTER SUCTION RIGID RED 1500CC (40EA/CA)

## (undated) DEVICE — BLANKET WARMING LOWER BODY - (10/CA) INACTIVE USE #8585

## (undated) DEVICE — SUCTION INSTRUMENT YANKAUER BULBOUS TIP W/O VENT (50EA/CA)

## (undated) DEVICE — DRESSING ABDOMINAL PAD STERILE 8 X 10" (360EA/CA)"

## (undated) DEVICE — BOVIE NEEDLE TIP INSULATD NON-SAFETY 2CM (50/PK)

## (undated) DEVICE — STAPLER SKIN DISP - (6/BX 10BX/CA) VISISTAT

## (undated) DEVICE — LACTATED RINGERS INJ 1000 ML - (14EA/CA 60CA/PF)

## (undated) DEVICE — PAD LAP STERILE 18 X 18 - (5/PK 40PK/CA)

## (undated) DEVICE — PROTECTOR ULNA NERVE - (36PR/CA)

## (undated) DEVICE — DRAPE SURGICAL U 77X120 - (10/CA)

## (undated) DEVICE — BANDAGE ELASTIC 4 HONEYCOMB - 4"X5YD LF (20/CA)"